# Patient Record
Sex: FEMALE | Race: WHITE | Employment: OTHER | URBAN - METROPOLITAN AREA
[De-identification: names, ages, dates, MRNs, and addresses within clinical notes are randomized per-mention and may not be internally consistent; named-entity substitution may affect disease eponyms.]

---

## 2018-08-22 ENCOUNTER — HOSPITAL ENCOUNTER (OUTPATIENT)
Age: 68
Discharge: HOME | End: 2018-08-24
Payer: MEDICARE

## 2018-08-22 LAB
ALBUMIN SERPL-MCNC: 4.1 G/DL (ref 3.5–5.2)
ALP BLD-CCNC: 47 U/L (ref 35–104)
ALT SERPL-CCNC: 20 U/L (ref 0–32)
ANION GAP SERPL CALCULATED.3IONS-SCNC: 12 MMOL/L (ref 7–16)
AST SERPL-CCNC: 23 U/L (ref 0–31)
BASOPHILS ABSOLUTE: 0.05 E9/L (ref 0–0.2)
BASOPHILS RELATIVE PERCENT: 0.9 % (ref 0–2)
BILIRUB SERPL-MCNC: 0.5 MG/DL (ref 0–1.2)
BUN BLDV-MCNC: 17 MG/DL (ref 8–23)
CALCIUM SERPL-MCNC: 9.4 MG/DL (ref 8.6–10.2)
CHLORIDE BLD-SCNC: 103 MMOL/L (ref 98–107)
CHOLESTEROL, TOTAL: 234 MG/DL (ref 0–199)
CO2: 25 MMOL/L (ref 22–29)
CREAT SERPL-MCNC: 0.8 MG/DL (ref 0.5–1)
EOSINOPHILS ABSOLUTE: 0.15 E9/L (ref 0.05–0.5)
EOSINOPHILS RELATIVE PERCENT: 2.6 % (ref 0–6)
GFR AFRICAN AMERICAN: >60
GFR NON-AFRICAN AMERICAN: >60 ML/MIN/1.73
GLUCOSE BLD-MCNC: 91 MG/DL (ref 74–109)
HCT VFR BLD CALC: 39.9 % (ref 34–48)
HDLC SERPL-MCNC: 82 MG/DL
HEMOGLOBIN: 12.8 G/DL (ref 11.5–15.5)
IMMATURE GRANULOCYTES #: 0.02 E9/L
IMMATURE GRANULOCYTES %: 0.4 % (ref 0–5)
LDL CHOLESTEROL CALCULATED: 134 MG/DL (ref 0–99)
LYMPHOCYTES ABSOLUTE: 1.65 E9/L (ref 1.5–4)
LYMPHOCYTES RELATIVE PERCENT: 29 % (ref 20–42)
MCH RBC QN AUTO: 30.3 PG (ref 26–35)
MCHC RBC AUTO-ENTMCNC: 32.1 % (ref 32–34.5)
MCV RBC AUTO: 94.3 FL (ref 80–99.9)
MONOCYTES ABSOLUTE: 0.62 E9/L (ref 0.1–0.95)
MONOCYTES RELATIVE PERCENT: 10.9 % (ref 2–12)
NEUTROPHILS ABSOLUTE: 3.19 E9/L (ref 1.8–7.3)
NEUTROPHILS RELATIVE PERCENT: 56.2 % (ref 43–80)
PDW BLD-RTO: 13.7 FL (ref 11.5–15)
PLATELET # BLD: 273 E9/L (ref 130–450)
PMV BLD AUTO: 11 FL (ref 7–12)
POTASSIUM SERPL-SCNC: 4.7 MMOL/L (ref 3.5–5)
RBC # BLD: 4.23 E12/L (ref 3.5–5.5)
SODIUM BLD-SCNC: 140 MMOL/L (ref 132–146)
TOTAL PROTEIN: 6.9 G/DL (ref 6.4–8.3)
TRIGL SERPL-MCNC: 92 MG/DL (ref 0–149)
VLDLC SERPL CALC-MCNC: 18 MG/DL
WBC # BLD: 5.7 E9/L (ref 4.5–11.5)

## 2018-08-22 PROCEDURE — 80053 COMPREHEN METABOLIC PANEL: CPT

## 2018-08-22 PROCEDURE — 80061 LIPID PANEL: CPT

## 2018-08-22 PROCEDURE — 85025 COMPLETE CBC W/AUTO DIFF WBC: CPT

## 2018-08-28 ENCOUNTER — TELEPHONE (OUTPATIENT)
Dept: BREAST CENTER | Age: 68
End: 2018-08-28

## 2018-08-31 DIAGNOSIS — Z85.3 HISTORY OF RIGHT BREAST CANCER: Primary | ICD-10-CM

## 2018-09-14 ASSESSMENT — ENCOUNTER SYMPTOMS
VOMITING: 0
COUGH: 0
EYE ITCHING: 0
EYE DISCHARGE: 0
TROUBLE SWALLOWING: 0
RHINORRHEA: 0
CHOKING: 0
ABDOMINAL PAIN: 0
SHORTNESS OF BREATH: 0
SORE THROAT: 0
SINUS PAIN: 0
NAUSEA: 0
WHEEZING: 0
CONSTIPATION: 0
ABDOMINAL DISTENTION: 0
DIARRHEA: 0
CHEST TIGHTNESS: 0
BLOOD IN STOOL: 0
VOICE CHANGE: 0
SINUS PRESSURE: 0
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
## 2018-09-24 ENCOUNTER — OFFICE VISIT (OUTPATIENT)
Dept: BREAST CENTER | Age: 68
End: 2018-09-24
Payer: MEDICARE

## 2018-09-24 ENCOUNTER — HOSPITAL ENCOUNTER (OUTPATIENT)
Dept: GENERAL RADIOLOGY | Age: 68
Discharge: HOME | End: 2018-09-26
Payer: MEDICARE

## 2018-09-24 VITALS
OXYGEN SATURATION: 96 % | SYSTOLIC BLOOD PRESSURE: 112 MMHG | BODY MASS INDEX: 23.34 KG/M2 | HEIGHT: 65 IN | HEART RATE: 78 BPM | DIASTOLIC BLOOD PRESSURE: 70 MMHG | TEMPERATURE: 98 F | WEIGHT: 140.1 LBS | RESPIRATION RATE: 16 BRPM

## 2018-09-24 DIAGNOSIS — Z85.3 HISTORY OF RIGHT BREAST CANCER: ICD-10-CM

## 2018-09-24 DIAGNOSIS — Z85.3 PERSONAL HISTORY OF MALIGNANT NEOPLASM OF BREAST: Primary | ICD-10-CM

## 2018-09-24 PROCEDURE — 1036F TOBACCO NON-USER: CPT | Performed by: NURSE PRACTITIONER

## 2018-09-24 PROCEDURE — 77067 SCR MAMMO BI INCL CAD: CPT

## 2018-09-24 PROCEDURE — G8427 DOCREV CUR MEDS BY ELIG CLIN: HCPCS | Performed by: NURSE PRACTITIONER

## 2018-09-24 PROCEDURE — G8420 CALC BMI NORM PARAMETERS: HCPCS | Performed by: NURSE PRACTITIONER

## 2018-09-24 PROCEDURE — 1123F ACP DISCUSS/DSCN MKR DOCD: CPT | Performed by: NURSE PRACTITIONER

## 2018-09-24 PROCEDURE — 4040F PNEUMOC VAC/ADMIN/RCVD: CPT | Performed by: NURSE PRACTITIONER

## 2018-09-24 PROCEDURE — 99204 OFFICE O/P NEW MOD 45 MIN: CPT | Performed by: NURSE PRACTITIONER

## 2018-09-24 PROCEDURE — 1090F PRES/ABSN URINE INCON ASSESS: CPT | Performed by: NURSE PRACTITIONER

## 2018-09-24 PROCEDURE — G8400 PT W/DXA NO RESULTS DOC: HCPCS | Performed by: NURSE PRACTITIONER

## 2018-09-24 PROCEDURE — 99203 OFFICE O/P NEW LOW 30 MIN: CPT

## 2018-09-24 PROCEDURE — 3017F COLORECTAL CA SCREEN DOC REV: CPT | Performed by: NURSE PRACTITIONER

## 2018-09-24 PROCEDURE — 1101F PT FALLS ASSESS-DOCD LE1/YR: CPT | Performed by: NURSE PRACTITIONER

## 2018-09-24 ASSESSMENT — ENCOUNTER SYMPTOMS: BACK PAIN: 1

## 2018-09-25 ENCOUNTER — TELEPHONE (OUTPATIENT)
Dept: BREAST CENTER | Age: 68
End: 2018-09-25

## 2018-09-25 DIAGNOSIS — Z12.31 VISIT FOR SCREENING MAMMOGRAM: Primary | ICD-10-CM

## 2018-10-11 ENCOUNTER — OFFICE VISIT (OUTPATIENT)
Dept: SURGERY | Age: 68
End: 2018-10-11
Payer: MEDICARE

## 2018-10-11 VITALS
OXYGEN SATURATION: 97 % | TEMPERATURE: 97.9 F | DIASTOLIC BLOOD PRESSURE: 64 MMHG | HEIGHT: 65 IN | WEIGHT: 140 LBS | BODY MASS INDEX: 23.32 KG/M2 | SYSTOLIC BLOOD PRESSURE: 104 MMHG | HEART RATE: 92 BPM

## 2018-10-11 DIAGNOSIS — Z85.3 HX OF BREAST CANCER: Primary | ICD-10-CM

## 2018-10-11 PROCEDURE — 1090F PRES/ABSN URINE INCON ASSESS: CPT | Performed by: PLASTIC SURGERY

## 2018-10-11 PROCEDURE — G8427 DOCREV CUR MEDS BY ELIG CLIN: HCPCS | Performed by: PLASTIC SURGERY

## 2018-10-11 PROCEDURE — 99204 OFFICE O/P NEW MOD 45 MIN: CPT | Performed by: PLASTIC SURGERY

## 2018-10-11 PROCEDURE — G8484 FLU IMMUNIZE NO ADMIN: HCPCS | Performed by: PLASTIC SURGERY

## 2018-10-11 PROCEDURE — 3017F COLORECTAL CA SCREEN DOC REV: CPT | Performed by: PLASTIC SURGERY

## 2018-10-11 PROCEDURE — G8420 CALC BMI NORM PARAMETERS: HCPCS | Performed by: PLASTIC SURGERY

## 2018-10-11 PROCEDURE — 1101F PT FALLS ASSESS-DOCD LE1/YR: CPT | Performed by: PLASTIC SURGERY

## 2018-10-11 PROCEDURE — 1036F TOBACCO NON-USER: CPT | Performed by: PLASTIC SURGERY

## 2018-10-11 PROCEDURE — 1123F ACP DISCUSS/DSCN MKR DOCD: CPT | Performed by: PLASTIC SURGERY

## 2018-10-11 PROCEDURE — 4040F PNEUMOC VAC/ADMIN/RCVD: CPT | Performed by: PLASTIC SURGERY

## 2018-10-11 PROCEDURE — G8400 PT W/DXA NO RESULTS DOC: HCPCS | Performed by: PLASTIC SURGERY

## 2018-10-11 NOTE — PROGRESS NOTES
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 ANAND-ALCL is not a breast cancer, but a rare and treatable T-cell lymphoma that usually develops as a fluid swelling around breast implants. The lifetime risk for this disease appears to be about 1 case for every 30,000 textured implants. Thus far, there have been no confirmed cases of ANAND-ALCL in women who have had only smooth-surface breast implants. The FDA is not recommending removal of textured implants. Rather, the FDA recommends, as do I, that every woman conduct regular self-examination. The patient was educated that if she does develop ANAND-ALCL she may require additional treatment such as radiation or chemotherapy along with removal of the breast implant and surrounding scar tissue. The risks, benefits and options were discussed with the pt. The risks included but not limited to pain, bleeding, infection, heavy scarring, damage to surrounding structures,  and need for further procedures. Other risks including but not limited to asymmetry, loss of nipple or/and areola, loss of sensation to nipple and areola, inability to breast feed, seroma, hematoma, implant failure, capsular contracture, and fat necrosis were also discussed with the patient. All of her questions were answered. .    Plastic and Reconstructive surgical procedure- no reconstruction at this time as patient does not want surgery. I informed the patient that her options include a left breast reduction to better match her right breast parenchyma. She states she will contemplate this surgical option and let our office know she wants to do this in the future. All of her questions were answered to her satisfaction and she agrees to proceed with the operation. Face-to-face time greater than 45 minutes, greater than 50% in counseling, education, and coordination of care. Photos were obtained.  Chaperone present      I attest that the patient was

## 2018-10-30 ENCOUNTER — APPOINTMENT (OUTPATIENT)
Dept: GENERAL RADIOLOGY | Age: 68
End: 2018-10-30
Payer: MEDICARE

## 2018-10-30 ENCOUNTER — HOSPITAL ENCOUNTER (EMERGENCY)
Age: 68
Discharge: HOME | End: 2018-10-30
Payer: MEDICARE

## 2018-10-30 VITALS
HEART RATE: 72 BPM | OXYGEN SATURATION: 98 % | DIASTOLIC BLOOD PRESSURE: 82 MMHG | SYSTOLIC BLOOD PRESSURE: 116 MMHG | RESPIRATION RATE: 16 BRPM | TEMPERATURE: 97.7 F

## 2018-10-30 DIAGNOSIS — S40.021A CONTUSION OF MULTIPLE SITES OF RIGHT SHOULDER AND UPPER ARM, INITIAL ENCOUNTER: Primary | ICD-10-CM

## 2018-10-30 DIAGNOSIS — S40.011A CONTUSION OF MULTIPLE SITES OF RIGHT SHOULDER AND UPPER ARM, INITIAL ENCOUNTER: Primary | ICD-10-CM

## 2018-10-30 PROCEDURE — 99212 OFFICE O/P EST SF 10 MIN: CPT

## 2018-10-30 PROCEDURE — 73030 X-RAY EXAM OF SHOULDER: CPT

## 2018-10-30 ASSESSMENT — PAIN DESCRIPTION - PAIN TYPE: TYPE: ACUTE PAIN

## 2018-10-30 ASSESSMENT — PAIN DESCRIPTION - FREQUENCY: FREQUENCY: CONTINUOUS

## 2018-10-30 ASSESSMENT — PAIN SCALES - GENERAL: PAINLEVEL_OUTOF10: 6

## 2018-10-30 ASSESSMENT — PAIN DESCRIPTION - PROGRESSION: CLINICAL_PROGRESSION: GRADUALLY WORSENING

## 2018-10-30 ASSESSMENT — PAIN DESCRIPTION - ORIENTATION: ORIENTATION: RIGHT

## 2018-10-30 ASSESSMENT — PAIN DESCRIPTION - LOCATION: LOCATION: SHOULDER

## 2019-08-22 ENCOUNTER — HOSPITAL ENCOUNTER (OUTPATIENT)
Age: 69
Discharge: HOME | End: 2019-08-24
Payer: MEDICARE

## 2019-08-22 LAB
ALBUMIN SERPL-MCNC: 4.3 G/DL (ref 3.5–5.2)
ALP BLD-CCNC: 49 U/L (ref 35–104)
ALT SERPL-CCNC: 22 U/L (ref 0–32)
ANION GAP SERPL CALCULATED.3IONS-SCNC: 12 MMOL/L (ref 7–16)
AST SERPL-CCNC: 29 U/L (ref 0–31)
BASOPHILS ABSOLUTE: 0.05 E9/L (ref 0–0.2)
BASOPHILS RELATIVE PERCENT: 0.9 % (ref 0–2)
BILIRUB SERPL-MCNC: 0.4 MG/DL (ref 0–1.2)
BUN BLDV-MCNC: 17 MG/DL (ref 8–23)
CALCIUM SERPL-MCNC: 9.6 MG/DL (ref 8.6–10.2)
CHLORIDE BLD-SCNC: 104 MMOL/L (ref 98–107)
CHOLESTEROL, TOTAL: 266 MG/DL (ref 0–199)
CO2: 25 MMOL/L (ref 22–29)
CREAT SERPL-MCNC: 0.8 MG/DL (ref 0.5–1)
EOSINOPHILS ABSOLUTE: 0.2 E9/L (ref 0.05–0.5)
EOSINOPHILS RELATIVE PERCENT: 3.5 % (ref 0–6)
GFR AFRICAN AMERICAN: >60
GFR NON-AFRICAN AMERICAN: >60 ML/MIN/1.73
GLUCOSE BLD-MCNC: 94 MG/DL (ref 74–99)
HCT VFR BLD CALC: 42.4 % (ref 34–48)
HDLC SERPL-MCNC: 83 MG/DL
HEMOGLOBIN: 13.7 G/DL (ref 11.5–15.5)
IMMATURE GRANULOCYTES #: 0.02 E9/L
IMMATURE GRANULOCYTES %: 0.3 % (ref 0–5)
LDL CHOLESTEROL CALCULATED: 159 MG/DL (ref 0–99)
LYMPHOCYTES ABSOLUTE: 1.56 E9/L (ref 1.5–4)
LYMPHOCYTES RELATIVE PERCENT: 27.1 % (ref 20–42)
MCH RBC QN AUTO: 31.1 PG (ref 26–35)
MCHC RBC AUTO-ENTMCNC: 32.3 % (ref 32–34.5)
MCV RBC AUTO: 96.1 FL (ref 80–99.9)
MONOCYTES ABSOLUTE: 0.55 E9/L (ref 0.1–0.95)
MONOCYTES RELATIVE PERCENT: 9.5 % (ref 2–12)
NEUTROPHILS ABSOLUTE: 3.38 E9/L (ref 1.8–7.3)
NEUTROPHILS RELATIVE PERCENT: 58.7 % (ref 43–80)
PDW BLD-RTO: 13.3 FL (ref 11.5–15)
PLATELET # BLD: 281 E9/L (ref 130–450)
PMV BLD AUTO: 10.5 FL (ref 7–12)
POTASSIUM REFLEX MAGNESIUM: 5 MMOL/L (ref 3.5–5)
RBC # BLD: 4.41 E12/L (ref 3.5–5.5)
SODIUM BLD-SCNC: 141 MMOL/L (ref 132–146)
T4 FREE: 1.31 NG/DL (ref 0.93–1.7)
TOTAL PROTEIN: 6.9 G/DL (ref 6.4–8.3)
TRIGL SERPL-MCNC: 119 MG/DL (ref 0–149)
TSH SERPL DL<=0.05 MIU/L-ACNC: 1.41 UIU/ML (ref 0.27–4.2)
VITAMIN D 25-HYDROXY: 35 NG/ML (ref 30–100)
VLDLC SERPL CALC-MCNC: 24 MG/DL
WBC # BLD: 5.8 E9/L (ref 4.5–11.5)

## 2019-08-22 PROCEDURE — 80053 COMPREHEN METABOLIC PANEL: CPT

## 2019-08-22 PROCEDURE — 85025 COMPLETE CBC W/AUTO DIFF WBC: CPT

## 2019-08-22 PROCEDURE — 80061 LIPID PANEL: CPT

## 2019-08-22 PROCEDURE — 82306 VITAMIN D 25 HYDROXY: CPT

## 2019-08-22 PROCEDURE — 84439 ASSAY OF FREE THYROXINE: CPT

## 2019-08-22 PROCEDURE — 84443 ASSAY THYROID STIM HORMONE: CPT

## 2019-09-17 ENCOUNTER — HOSPITAL ENCOUNTER (OUTPATIENT)
Dept: MAMMOGRAPHY | Age: 69
Discharge: HOME | End: 2019-09-19
Payer: MEDICARE

## 2019-09-17 DIAGNOSIS — M81.0 AGE-RELATED OSTEOPOROSIS WITHOUT CURRENT PATHOLOGICAL FRACTURE: ICD-10-CM

## 2019-09-17 PROCEDURE — 77080 DXA BONE DENSITY AXIAL: CPT

## 2019-09-20 ASSESSMENT — ENCOUNTER SYMPTOMS
TROUBLE SWALLOWING: 0
ABDOMINAL DISTENTION: 0
RHINORRHEA: 0
SHORTNESS OF BREATH: 0
BLOOD IN STOOL: 0
DIARRHEA: 0
EYE DISCHARGE: 0
COUGH: 0
SORE THROAT: 0
BACK PAIN: 1
ABDOMINAL PAIN: 0
VOMITING: 0
SINUS PAIN: 0
CHEST TIGHTNESS: 0
CHOKING: 0
EYE ITCHING: 0
VOICE CHANGE: 0
CONSTIPATION: 0
WHEEZING: 0
SINUS PRESSURE: 0
NAUSEA: 0

## 2019-09-20 NOTE — PROGRESS NOTES
quit: 1977     Years since quittin.6    Smokeless tobacco: Never Used   Substance and Sexual Activity    Alcohol use: No     Alcohol/week: 0.0 standard drinks     Comment: socially    Drug use: No    Sexual activity: Yes     Partners: Male   Lifestyle    Physical activity:     Days per week: Not on file     Minutes per session: Not on file    Stress: Not on file   Relationships    Social connections:     Talks on phone: Not on file     Gets together: Not on file     Attends Religion service: Not on file     Active member of club or organization: Not on file     Attends meetings of clubs or organizations: Not on file     Relationship status: Not on file    Intimate partner violence:     Fear of current or ex partner: Not on file     Emotionally abused: Not on file     Physically abused: Not on file     Forced sexual activity: Not on file   Other Topics Concern    Not on file   Social History Narrative    Not on file       Occupation: Retired Nurse. Review of Systems   Constitutional: Negative for activity change, appetite change, chills, fatigue, fever and unexpected weight change. HENT: Negative for congestion, postnasal drip, rhinorrhea, sinus pressure, sinus pain, sore throat, trouble swallowing and voice change. Eyes: Negative for discharge, itching and visual disturbance. Respiratory: Negative for cough, choking, chest tightness, shortness of breath and wheezing. Cardiovascular: Negative for chest pain, palpitations and leg swelling. Gastrointestinal: Negative for abdominal distention, abdominal pain, blood in stool, constipation, diarrhea, nausea and vomiting. Endocrine: Negative for cold intolerance and heat intolerance. Genitourinary: Negative for difficulty urinating, dysuria, frequency and hematuria. Musculoskeletal: Positive for back pain. Negative for arthralgias, gait problem, joint swelling, myalgias, neck pain and neck stiffness.         Chronic back pain, today, 09/25/2019:    Finding 1:   There is a focal asymmetry seen in the posterior central region of the left breast.       IMPRESSION:   Focal asymmetry in the left breast requires additional evaluation. An ultrasound exam is recommended.       =======================================   BI-RADS Category 0:  Incomplete: Need Additional Imaging Evaluation     Left breast US 09/25/2019:  Finding 1:   Sonography was performed in the left breast using a radial and anti-radial approach. Additional evaluation was performed for the focal asymmetry in the left breast, central seen on 09/25/2019. No sonographic evidence of suspicious solid or cystic mass    lesions. No focal areas of suspicious atypical echogenicity.       IMPRESSION:   There is no sonographic evidence of malignancy.       Screening mammogram in 1 year is recommended.       =======================================   BI-RADS Category 1:  Negative     Clinically, she is doing well and is without evidence of recurrence. We reviewed her genetic testing results. She reports that her Sister and her Niece also had genetic testing and all of them were also found to have BRCA-1 VUS. Reviewed screening guidelines as recommended by [employer] (recommending high risk protocol). At this time, will plan to have her return in 6 months with MRI bilateral breasts prior. Plan:      1. Continue monthly breast self examination; detailed instructions reviewed today. Bring any changes to your physician's attention. 2. Continue healthy diet and exercise routinely as tolerated. 3. Avoid alcohol or limit alcohol intake to < 3 drinks per week. 4. Limit caffeine intake. 5. Repeat mammogram 1 year. 6. Continue follow up with Primary Care. 7. RTC 6 months with MRI breast prior. During today's visit, face-to-face time 25 minutes, greater than 50% in counseling education and coordination of care.  All questions were answered to her apparent satisfaction, and she is agreeable to the plan as outlined above. Carmen Perez, RN, MSN, ACNP-BC, AOCNP  Advanced Oncology Certified Nurse Practitioner  Department of Breast Surgery  [facility]/  [facility] in collaboration with Dr. Julieta Muro.  Jeremiah Wright, APRN - CNP

## 2019-09-25 ENCOUNTER — HOSPITAL ENCOUNTER (OUTPATIENT)
Dept: GENERAL RADIOLOGY | Age: 69
Discharge: HOME | End: 2019-09-27
Payer: MEDICARE

## 2019-09-25 ENCOUNTER — OFFICE VISIT (OUTPATIENT)
Dept: BREAST CENTER | Age: 69
End: 2019-09-25
Payer: MEDICARE

## 2019-09-25 VITALS
BODY MASS INDEX: 23.63 KG/M2 | OXYGEN SATURATION: 98 % | WEIGHT: 147 LBS | RESPIRATION RATE: 16 BRPM | HEIGHT: 66 IN | HEART RATE: 76 BPM | SYSTOLIC BLOOD PRESSURE: 120 MMHG | DIASTOLIC BLOOD PRESSURE: 80 MMHG | TEMPERATURE: 97.8 F

## 2019-09-25 DIAGNOSIS — Z12.31 VISIT FOR SCREENING MAMMOGRAM: ICD-10-CM

## 2019-09-25 DIAGNOSIS — R92.8 ABNORMAL MAMMOGRAM OF LEFT BREAST: ICD-10-CM

## 2019-09-25 DIAGNOSIS — R92.2 DENSE BREAST TISSUE ON MAMMOGRAM: Primary | ICD-10-CM

## 2019-09-25 DIAGNOSIS — Z85.3 PERSONAL HISTORY OF BREAST CANCER: ICD-10-CM

## 2019-09-25 DIAGNOSIS — Z12.39 BREAST CANCER SCREENING, HIGH RISK PATIENT: ICD-10-CM

## 2019-09-25 PROCEDURE — 99214 OFFICE O/P EST MOD 30 MIN: CPT | Performed by: NURSE PRACTITIONER

## 2019-09-25 PROCEDURE — G8420 CALC BMI NORM PARAMETERS: HCPCS | Performed by: NURSE PRACTITIONER

## 2019-09-25 PROCEDURE — 4040F PNEUMOC VAC/ADMIN/RCVD: CPT | Performed by: NURSE PRACTITIONER

## 2019-09-25 PROCEDURE — G8427 DOCREV CUR MEDS BY ELIG CLIN: HCPCS | Performed by: NURSE PRACTITIONER

## 2019-09-25 PROCEDURE — 77063 BREAST TOMOSYNTHESIS BI: CPT

## 2019-09-25 PROCEDURE — 1090F PRES/ABSN URINE INCON ASSESS: CPT | Performed by: NURSE PRACTITIONER

## 2019-09-25 PROCEDURE — 1123F ACP DISCUSS/DSCN MKR DOCD: CPT | Performed by: NURSE PRACTITIONER

## 2019-09-25 PROCEDURE — 76642 ULTRASOUND BREAST LIMITED: CPT

## 2019-09-25 PROCEDURE — 3017F COLORECTAL CA SCREEN DOC REV: CPT | Performed by: NURSE PRACTITIONER

## 2019-09-25 PROCEDURE — G8399 PT W/DXA RESULTS DOCUMENT: HCPCS | Performed by: NURSE PRACTITIONER

## 2019-09-25 PROCEDURE — 1036F TOBACCO NON-USER: CPT | Performed by: NURSE PRACTITIONER

## 2019-09-25 PROCEDURE — 99213 OFFICE O/P EST LOW 20 MIN: CPT | Performed by: NURSE PRACTITIONER

## 2019-09-25 RX ORDER — ANTIARTHRITIC COMBINATION NO.2 900 MG
TABLET ORAL DAILY
COMMUNITY

## 2019-09-25 RX ORDER — LORATADINE 10 MG/1
10 TABLET ORAL DAILY
COMMUNITY

## 2020-02-19 ENCOUNTER — TELEPHONE (OUTPATIENT)
Dept: BREAST CENTER | Age: 70
End: 2020-02-19

## 2020-02-19 NOTE — TELEPHONE ENCOUNTER
Spoke with patient in reference to her breast MRI that is due March 2020. She will have lab work done prior at [facility]. No cycles. Will obtain authorization from her insurance and schedule.

## 2020-03-02 ENCOUNTER — HOSPITAL ENCOUNTER (OUTPATIENT)
Age: 70
Discharge: HOME | End: 2020-03-02
Payer: MEDICARE

## 2020-03-02 LAB
BUN BLDV-MCNC: 15 MG/DL (ref 8–23)
CREAT SERPL-MCNC: 0.9 MG/DL (ref 0.5–1)
GFR AFRICAN AMERICAN: >60
GFR NON-AFRICAN AMERICAN: >60 ML/MIN/1.73

## 2020-03-02 PROCEDURE — 82565 ASSAY OF CREATININE: CPT

## 2020-03-02 PROCEDURE — 36415 COLL VENOUS BLD VENIPUNCTURE: CPT

## 2020-03-02 PROCEDURE — 84520 ASSAY OF UREA NITROGEN: CPT

## 2020-03-05 ENCOUNTER — HOSPITAL ENCOUNTER (OUTPATIENT)
Dept: MRI IMAGING | Age: 70
Discharge: HOME | End: 2020-03-07
Payer: MEDICARE

## 2020-03-05 PROCEDURE — 6360000004 HC RX CONTRAST MEDICATION: Performed by: RADIOLOGY

## 2020-03-05 PROCEDURE — A9585 GADOBUTROL INJECTION: HCPCS | Performed by: RADIOLOGY

## 2020-03-05 PROCEDURE — 77049 MRI BREAST C-+ W/CAD BI: CPT

## 2020-03-05 RX ADMIN — GADOBUTROL 6 ML: 604.72 INJECTION INTRAVENOUS at 14:08

## 2020-03-06 ASSESSMENT — ENCOUNTER SYMPTOMS
NAUSEA: 0
CONSTIPATION: 0
DIARRHEA: 0
EYE DISCHARGE: 0
ABDOMINAL DISTENTION: 0
ABDOMINAL PAIN: 0
TROUBLE SWALLOWING: 0
RHINORRHEA: 0
SINUS PAIN: 0
SHORTNESS OF BREATH: 0
VOICE CHANGE: 0
CHOKING: 0
EYE ITCHING: 0
CHEST TIGHTNESS: 0
BLOOD IN STOOL: 0
VOMITING: 0
COUGH: 0
WHEEZING: 0
SORE THROAT: 0
SINUS PRESSURE: 0

## 2020-03-06 NOTE — PROGRESS NOTES
Subjective:  Hx pre-menopausal Right IDC in ;  Treated with lumpectomy, requiring re-excision. Reports axillary LN were negative. -Received adjuvant Chemotherapy (?TC) ×4 cycles at [facility].   -XRT X 6 weeks.    -Endocrine therapy with Tamoxifen, but only took it for 1 year; on Raloxifene since that time. Patient ID: Ben Bauer is a 79 y.o. female. HPI   History and Physical    Patient's Name/Date of Birth: Ben Bauer / 1950      Ben Bauer presents to establish care. PCP: Amaya Alcazar MD.    79 y.o. female with PMH breast cancer in  at the age of 37.      -History of Leiomyoma  post TAHBSO    Estimated body mass index is 24.09 kg/m² as calculated from the following:    Height as of 19: 5' 5.5\" (1.664 m). Weight as of 19: 147 lb (66.7 kg). Bra Size: 32DD on Left; 32 C    Patient admits to a personal history of breast cancer. Breast cancer risk factors include age, gender, personal history of breast cancer; Mother with breast cancer in her 63's with reoccurrence in her 66's;  Sister with breast cancer in her 63's; Paternal grandmother with Pancreatic cancer. Age of menarche was 15. Age of menopause was 1994 due to chemotherapy. Patient denies hormonal therapy. Patient is . Age of first live birth was 32;  Patient did breast feed. Patient drinks little caffeinated beverages. She does not smoke cigarettes. Quit .       Past Medical History:   Diagnosis Date    Arthritis     Breast cancer ([address]     infiltrating ductal  right breast    Hallux rigidus of right foot 2017    Neuroma of foot     great toe    PONV (postoperative nausea and vomiting)        Past Surgical History:   Procedure Laterality Date    APPENDECTOMY      BREAST SURGERY Right     lumpectomy    COLONOSCOPY  2016    HYSTERECTOMY      JOINT REPLACEMENT Left     knee, left    LAMINECTOMY  09/01/2015    x2    LUMBAR FUSION      L4-L5 X2    OVARIAN CYST SURGERY      PRE-MALIGNANT / BENIGN SKIN LESION EXCISION      squamous cell     TOE FUSION Right 02/21/2017    Right 1st Metatarsophalangeal Joint fusion right great toe neuroma resection    TOE SURGERY Right     great toe    TONSILLECTOMY         Current Outpatient Medications   Medication Sig Dispense Refill    loratadine (CLARITIN) 10 MG tablet Take 10 mg by mouth daily      Biotin 5000 MCG TABS Take by mouth daily      traMADol (ULTRAM) 50 MG tablet Take 50 mg by mouth every 6 hours as needed for Pain      celecoxib (CELEBREX) 100 MG capsule Take 100 mg by mouth      tiZANidine (ZANAFLEX) 4 MG tablet Take 4 mg by mouth 2 times daily      calcium carbonate (TUMS) 500 MG chewable tablet Take 1 tablet by mouth as needed for Heartburn      RaNITidine HCl (ZANTAC 150 MAXIMUM STRENGTH PO) Take 1 tablet by mouth 2 times daily       raloxifene (EVISTA) 60 MG tablet Take 60 mg by mouth daily       Calcium Carbonate-Vitamin D (CALCIUM + D PO) Take 1 tablet by mouth daily        No current facility-administered medications for this visit.       Facility-Administered Medications Ordered in Other Visits   Medication Dose Route Frequency Provider Last Rate Last Dose    gadobutrol (GADAVIST) injection 6 mL  6 mL Intravenous ONCE PRN Norm Smack, DO           Allergies   Allergen Reactions    Tape [place] Tape] Dermatitis     paper           Social History     Socioeconomic History    Marital status:      Spouse name: Not on file    Number of children: Not on file    Years of education: Not on file    Highest education level: Not on file   Occupational History    Not on file   Social Needs    Financial resource strain: Not on file    Food insecurity:     Worry: Not on file     Inability: Not on file    Transportation needs:     Medical: Not on file     Non-medical: Not on file   Tobacco Use    Smoking status: Former Smoker     Packs/day: 1.00     Years: 9.00     Pack years: 9.00 Types: Cigarettes     Last attempt to quit: 1977     Years since quittin.0    Smokeless tobacco: Never Used   Substance and Sexual Activity    Alcohol use: No     Alcohol/week: 0.0 standard drinks     Comment: socially    Drug use: No    Sexual activity: Yes     Partners: Male   Lifestyle    Physical activity:     Days per week: Not on file     Minutes per session: Not on file    Stress: Not on file   Relationships    Social connections:     Talks on phone: Not on file     Gets together: Not on file     Attends Advent service: Not on file     Active member of club or organization: Not on file     Attends meetings of clubs or organizations: Not on file     Relationship status: Not on file    Intimate partner violence:     Fear of current or ex partner: Not on file     Emotionally abused: Not on file     Physically abused: Not on file     Forced sexual activity: Not on file   Other Topics Concern    Not on file   Social History Narrative    Not on file       Occupation: Retired Nurse. Review of Systems   Constitutional: Negative for activity change, appetite change, chills, fatigue, fever and unexpected weight change. HENT: Negative for congestion, postnasal drip, rhinorrhea, sinus pressure, sinus pain, sore throat, trouble swallowing and voice change. Eyes: Negative for discharge, itching and visual disturbance. Respiratory: Negative for cough, choking, chest tightness, shortness of breath and wheezing. Cardiovascular: Negative for chest pain, palpitations and leg swelling. Gastrointestinal: Negative for abdominal distention, abdominal pain, blood in stool, constipation, diarrhea, nausea and vomiting. Diverticulosis on colonoscopy; constipation controlled with AM fiber. Endocrine: Negative for cold intolerance and heat intolerance. Genitourinary: Negative for difficulty urinating, dysuria, frequency and hematuria.    Musculoskeletal: Negative for arthralgias, back pain, gait problem, joint swelling, myalgias, neck pain and neck stiffness. Chronic back pain, post fusions. Right foot drop due to back pain. Overall her pain has remained stable. Allergic/Immunologic: Negative for environmental allergies and food allergies. Neurological: Negative for dizziness, seizures, syncope, speech difficulty, weakness, light-headedness and headaches. Hematological: Negative for adenopathy. Does not bruise/bleed easily. Psychiatric/Behavioral: Negative for agitation, confusion and decreased concentration. The patient is not nervous/anxious. Objective:   Physical Exam  Vitals signs and nursing note reviewed. Constitutional:       General: She is not in acute distress. Appearance: She is well-developed. She is not diaphoretic. Comments: ECOG remains stable at 0   HENT:      Head: Normocephalic and atraumatic. Mouth/Throat:      Pharynx: No oropharyngeal exudate. Eyes:      General: No scleral icterus. Right eye: No discharge. Left eye: No discharge. Conjunctiva/sclera: Conjunctivae normal.   Neck:      Musculoskeletal: Normal range of motion and neck supple. Thyroid: No thyromegaly. Vascular: No JVD. Trachea: No tracheal deviation. Cardiovascular:      Rate and Rhythm: Normal rate and regular rhythm. Heart sounds: No murmur. No friction rub. No gallop. Pulmonary:      Effort: Pulmonary effort is normal. No respiratory distress or retractions. Breath sounds: Normal breath sounds. No stridor. No wheezing or rales. Chest:      Chest wall: No mass, lacerations, deformity, swelling, tenderness or edema. Breasts: Breasts are asymmetrical (due to post treatment changes. ). Right: No inverted nipple, mass, nipple discharge, skin change or tenderness. Left: No inverted nipple, mass, nipple discharge, skin change or tenderness.       Comments: Mild skin thickening on the right c/w post treatment changes. Scars intact. Otherwise, left breast is supple. No skin dimpling or puckering. No nipple discharge. No clinically suspicious lumps nodules or masses appreciated. No axillary lymphadenopathy. Abdominal:      General: There is no distension. Palpations: Abdomen is soft. Tenderness: There is no abdominal tenderness. There is no guarding or rebound. Musculoskeletal: Normal range of motion. General: No tenderness or deformity. Right shoulder: Normal.      Left shoulder: Normal.   Lymphadenopathy:      Cervical: No cervical adenopathy. Right cervical: No superficial, deep or posterior cervical adenopathy. Left cervical: No superficial, deep or posterior cervical adenopathy. Upper Body:      Right upper body: No pectoral adenopathy. Left upper body: No pectoral adenopathy. Skin:     General: Skin is warm and dry. Coloration: Skin is not pale. Findings: No erythema or rash. Neurological:      Mental Status: She is alert and oriented to person, place, and time. Coordination: Coordination normal.   Psychiatric:         Behavior: Behavior normal.         Thought Content: Thought content normal.         Judgment: Judgment normal.          Assessment:      79 y.o. female with history of Premenopausal (age 37) IDC, (T1) right breast per her records in 1993, s/p lumpectomy with LND per Dr. Randolph Ward @ [facility] (reported as negative lymph nodes) Chemotherapy and Radiation therapy followed by endocrine therapy with Tamoxifen, however she only took it for 1 years due to poor tolerance; Continues on Raloxifene since that time. Pathology report from [facility] requested, but no longer available. Breast cancer risk factors include age, gender, personal history of breast cancer; Mother with breast cancer in her 63's with re-occurrence in her 66's;  Sister with breast cancer in her 63's; Paternal grandmother with Pancreatic cancer.     She presented 09/24/2018 to establish care.      -A bilateral screening mammogram 09/24/2018:  Negative. -LFT's 08/22/2018 were essentially normal.      -On 12/05/2018 Counsyl Genetic test: BRCA-1 VUS. Seen by Plastics and Reconstructive surgery, Dr. Mateo Rivers on 10/11/2018 for asymmetry; she opted to forgo surgery at this time. Bilateral screening mammogram 09/25/2019:    Finding 1:   There is a focal asymmetry seen in the posterior central region of the left breast.       IMPRESSION:   Focal asymmetry in the left breast requires additional evaluation. An ultrasound exam is recommended.       =======================================   BI-RADS Category 0:  Incomplete: Need Additional Imaging Evaluation     Left breast US 09/25/2019:  Finding 1:   Sonography was performed in the left breast using a radial and anti-radial approach. Additional evaluation was performed for the focal asymmetry in the left breast, central seen on 09/25/2019. No sonographic evidence of suspicious solid or cystic mass    lesions. No focal areas of suspicious atypical echogenicity.       IMPRESSION:   There is no sonographic evidence of malignancy.       Screening mammogram in 1 year is recommended.       =======================================   BI-RADS Category 1:  Negative       She reports that her Sister and her Niece also had genetic testing and all of them were also found to have BRCA-1 VUS. Reviewed screening guidelines as recommended by [employer] (recommending high risk protocol). -High risk screening per protocol:    -MRI bilateral breasts 03/06/2020: Benign findings (BI-RADS-2). -B/L screening mammogram today, 12/01/2020:  Negative, BI-RADS 1. Clinically, she is without evidence of malignancy. She continues on Raloxifene for bone health per PCP. No clinically suspicious findings. Diverticulosis:  Colonoscopy per Dr. Amor Yip June 2020. Plan:      1.  Continue monthly breast self examination; detailed instructions reviewed today. Bring any changes to your physician's attention. 2. Continue healthy diet and exercise routinely as tolerated. 3. Avoid alcohol or limit alcohol intake to < 3 drinks per week. 4. Limit caffeine intake. 5. Repeat mammogram 1 year. 6. Continue follow up with Primary Care. 7. RTC 6 months with MRI bilateral breasts prior. During today's visit, face-to-face time 15 minutes, greater than 50% in counseling education and coordination of care. All questions were answered to her apparent satisfaction, and she is agreeable to the plan as outlined above. Dominique Zavala, RN, MSN, ACNP-BC, AOCNP  Advanced Oncology Certified Nurse Practitioner  Department of Breast Surgery  [facility]/  [facility] in collaboration with Dr. Remigio Dalal.  Leticia Quick, ZOFIA - CNP

## 2020-03-18 ENCOUNTER — TELEPHONE (OUTPATIENT)
Dept: BREAST CENTER | Age: 70
End: 2020-03-18

## 2020-03-18 NOTE — TELEPHONE ENCOUNTER
Called patient regarding her upcoming appointment at [facility] (office of Dr. Negrita Robles, Dr. Ovidio Quinn, and Mateo Elliott CNP). Discussed concerns in light of the current Pandemic with COVID 19. We discussed that while there continue to be daily developments and information regarding this virus, we recognize the need to take all measures to ensure we protect the health and welfare of the patients we serve. With this information in mind, she was given the option of keeping this appointment versus postponing both her appointment and imaging until a later date in hopes the pandemic will subside. At this time, she prefers to rescheduled her appointment. She was given a future follow up appointment and our phone number with instructions to continue BSE and to contact us in the interim if she should notice any change.     Patient is rescheduled for 9/28/20 @ 1:00pm imaging/office visit 2:00pm

## 2020-10-05 ENCOUNTER — HOSPITAL ENCOUNTER (OUTPATIENT)
Age: 70
Discharge: HOME | End: 2020-10-07
Payer: MEDICARE

## 2020-10-05 LAB
ALBUMIN SERPL-MCNC: 4.1 G/DL (ref 3.5–5.2)
ALP BLD-CCNC: 53 U/L (ref 35–104)
ALT SERPL-CCNC: 17 U/L (ref 0–32)
ANION GAP SERPL CALCULATED.3IONS-SCNC: 11 MMOL/L (ref 7–16)
AST SERPL-CCNC: 21 U/L (ref 0–31)
BASOPHILS ABSOLUTE: 0.04 E9/L (ref 0–0.2)
BASOPHILS RELATIVE PERCENT: 0.7 % (ref 0–2)
BILIRUB SERPL-MCNC: 0.4 MG/DL (ref 0–1.2)
BUN BLDV-MCNC: 19 MG/DL (ref 8–23)
CALCIUM SERPL-MCNC: 9.6 MG/DL (ref 8.6–10.2)
CHLORIDE BLD-SCNC: 105 MMOL/L (ref 98–107)
CHOLESTEROL, TOTAL: 256 MG/DL (ref 0–199)
CO2: 26 MMOL/L (ref 22–29)
CREAT SERPL-MCNC: 0.9 MG/DL (ref 0.5–1)
EOSINOPHILS ABSOLUTE: 0.22 E9/L (ref 0.05–0.5)
EOSINOPHILS RELATIVE PERCENT: 3.6 % (ref 0–6)
GFR AFRICAN AMERICAN: >60
GFR NON-AFRICAN AMERICAN: >60 ML/MIN/1.73
GLUCOSE BLD-MCNC: 94 MG/DL (ref 74–99)
HCT VFR BLD CALC: 44.1 % (ref 34–48)
HDLC SERPL-MCNC: 82 MG/DL
HEMOGLOBIN: 14.2 G/DL (ref 11.5–15.5)
IMMATURE GRANULOCYTES #: 0.02 E9/L
IMMATURE GRANULOCYTES %: 0.3 % (ref 0–5)
LDL CHOLESTEROL CALCULATED: 150 MG/DL (ref 0–99)
LYMPHOCYTES ABSOLUTE: 1.47 E9/L (ref 1.5–4)
LYMPHOCYTES RELATIVE PERCENT: 24 % (ref 20–42)
MCH RBC QN AUTO: 30.9 PG (ref 26–35)
MCHC RBC AUTO-ENTMCNC: 32.2 % (ref 32–34.5)
MCV RBC AUTO: 96.1 FL (ref 80–99.9)
MONOCYTES ABSOLUTE: 0.64 E9/L (ref 0.1–0.95)
MONOCYTES RELATIVE PERCENT: 10.5 % (ref 2–12)
NEUTROPHILS ABSOLUTE: 3.73 E9/L (ref 1.8–7.3)
NEUTROPHILS RELATIVE PERCENT: 60.9 % (ref 43–80)
PDW BLD-RTO: 13.2 FL (ref 11.5–15)
PLATELET # BLD: 275 E9/L (ref 130–450)
PMV BLD AUTO: 10.7 FL (ref 7–12)
POTASSIUM SERPL-SCNC: 5.2 MMOL/L (ref 3.5–5)
RBC # BLD: 4.59 E12/L (ref 3.5–5.5)
SODIUM BLD-SCNC: 142 MMOL/L (ref 132–146)
T4 FREE: 1.06 NG/DL (ref 0.93–1.7)
TOTAL PROTEIN: 6.8 G/DL (ref 6.4–8.3)
TRIGL SERPL-MCNC: 122 MG/DL (ref 0–149)
TSH SERPL DL<=0.05 MIU/L-ACNC: 2.22 UIU/ML (ref 0.27–4.2)
VITAMIN D 25-HYDROXY: 48 NG/ML (ref 30–100)
VLDLC SERPL CALC-MCNC: 24 MG/DL
WBC # BLD: 6.1 E9/L (ref 4.5–11.5)

## 2020-10-05 PROCEDURE — 80053 COMPREHEN METABOLIC PANEL: CPT

## 2020-10-05 PROCEDURE — 85025 COMPLETE CBC W/AUTO DIFF WBC: CPT

## 2020-10-05 PROCEDURE — 84439 ASSAY OF FREE THYROXINE: CPT

## 2020-10-05 PROCEDURE — 82306 VITAMIN D 25 HYDROXY: CPT

## 2020-10-05 PROCEDURE — 84443 ASSAY THYROID STIM HORMONE: CPT

## 2020-10-05 PROCEDURE — 80061 LIPID PANEL: CPT

## 2020-12-01 ENCOUNTER — HOSPITAL ENCOUNTER (OUTPATIENT)
Dept: GENERAL RADIOLOGY | Age: 70
Discharge: HOME | End: 2020-12-03
Payer: MEDICARE

## 2020-12-01 ENCOUNTER — OFFICE VISIT (OUTPATIENT)
Dept: BREAST CENTER | Age: 70
End: 2020-12-01
Payer: MEDICARE

## 2020-12-01 VITALS
RESPIRATION RATE: 18 BRPM | WEIGHT: 150 LBS | HEIGHT: 64 IN | OXYGEN SATURATION: 96 % | SYSTOLIC BLOOD PRESSURE: 118 MMHG | TEMPERATURE: 98 F | BODY MASS INDEX: 25.61 KG/M2 | DIASTOLIC BLOOD PRESSURE: 60 MMHG | HEART RATE: 74 BPM

## 2020-12-01 PROCEDURE — 99213 OFFICE O/P EST LOW 20 MIN: CPT | Performed by: NURSE PRACTITIONER

## 2020-12-01 PROCEDURE — 3017F COLORECTAL CA SCREEN DOC REV: CPT | Performed by: NURSE PRACTITIONER

## 2020-12-01 PROCEDURE — G8484 FLU IMMUNIZE NO ADMIN: HCPCS | Performed by: NURSE PRACTITIONER

## 2020-12-01 PROCEDURE — 1090F PRES/ABSN URINE INCON ASSESS: CPT | Performed by: NURSE PRACTITIONER

## 2020-12-01 PROCEDURE — 1123F ACP DISCUSS/DSCN MKR DOCD: CPT | Performed by: NURSE PRACTITIONER

## 2020-12-01 PROCEDURE — 77063 BREAST TOMOSYNTHESIS BI: CPT

## 2020-12-01 PROCEDURE — G8417 CALC BMI ABV UP PARAM F/U: HCPCS | Performed by: NURSE PRACTITIONER

## 2020-12-01 PROCEDURE — 1036F TOBACCO NON-USER: CPT | Performed by: NURSE PRACTITIONER

## 2020-12-01 PROCEDURE — G8399 PT W/DXA RESULTS DOCUMENT: HCPCS | Performed by: NURSE PRACTITIONER

## 2020-12-01 PROCEDURE — G8427 DOCREV CUR MEDS BY ELIG CLIN: HCPCS | Performed by: NURSE PRACTITIONER

## 2020-12-01 PROCEDURE — 4040F PNEUMOC VAC/ADMIN/RCVD: CPT | Performed by: NURSE PRACTITIONER

## 2020-12-01 RX ORDER — FAMOTIDINE 20 MG/1
20 TABLET, FILM COATED ORAL 2 TIMES DAILY
COMMUNITY

## 2020-12-01 RX ORDER — MAGNESIUM OXIDE 400 MG/1
400 TABLET ORAL DAILY
COMMUNITY

## 2020-12-01 ASSESSMENT — ENCOUNTER SYMPTOMS: BACK PAIN: 0

## 2020-12-01 NOTE — PATIENT INSTRUCTIONS
RELEASE OF RESULTS AND RECORDS  As of October 1, 2020, all results (x-ray reports, labwork, pathology reports) will be released through [address] in real time per federal law. Once your test results are final, they will be automatically released to your electronic medical record [employer] where you will be able to see those results and any clinical notes associated with that result. In some cases, you may see those results and notes before your provider or the staff have had a chance to review. We will make every effort to contact you, especially about any abnormal or confusing results. If you view a result before we have contacted you, please wait up to one business day for us to reach you before calling with questions. If anything in your notes seems inaccurate, please message us through [employer] with potential corrections. If you see a term or language in your clinical note that doesn't make sense, please use the online health library reference tool in your MyChart (under the Health tab)  to help you interpret the note.       6 MOS FOLLOW UP. OBTAIN MRI PRIOR TO VISIT

## 2021-04-06 ENCOUNTER — TELEPHONE (OUTPATIENT)
Dept: BREAST CENTER | Age: 71
End: 2021-04-06

## 2021-04-09 ENCOUNTER — TELEPHONE (OUTPATIENT)
Dept: BREAST CENTER | Age: 71
End: 2021-04-09

## 2021-04-09 NOTE — TELEPHONE ENCOUNTER
Patient returned call stating she will be able to proceed with breast MRI this month. She will call us if she decides to go to a non-[facility] facility for lab work because we will have to fax the orders over and make sure we receive results. Patient aware we will obtain a prior authorization then the [address] [place] [address]  will call her to schedule.

## 2021-04-19 DIAGNOSIS — Z80.3 FAMILY HISTORY OF BREAST CANCER IN FIRST DEGREE RELATIVE: ICD-10-CM

## 2021-04-19 DIAGNOSIS — Z12.39 BREAST CANCER SCREENING, HIGH RISK PATIENT: ICD-10-CM

## 2021-04-19 DIAGNOSIS — Z85.3 PERSONAL HISTORY OF BREAST CANCER: ICD-10-CM

## 2021-04-19 DIAGNOSIS — Z91.89 AT HIGH RISK FOR BREAST CANCER: ICD-10-CM

## 2021-04-19 LAB
BUN BLDV-MCNC: 16 MG/DL (ref 8–23)
CREAT SERPL-MCNC: 0.8 MG/DL (ref 0.5–1)
GFR AFRICAN AMERICAN: >60
GFR NON-AFRICAN AMERICAN: >60 ML/MIN/1.73

## 2021-04-22 ENCOUNTER — HOSPITAL ENCOUNTER (OUTPATIENT)
Dept: MRI IMAGING | Age: 71
Discharge: HOME | End: 2021-04-24
Payer: MEDICARE

## 2021-04-22 DIAGNOSIS — Z12.39 BREAST CANCER SCREENING, HIGH RISK PATIENT: ICD-10-CM

## 2021-04-22 DIAGNOSIS — Z80.3 FAMILY HISTORY OF BREAST CANCER IN FIRST DEGREE RELATIVE: ICD-10-CM

## 2021-04-22 DIAGNOSIS — Z85.3 PERSONAL HISTORY OF MALIGNANT NEOPLASM OF BREAST: ICD-10-CM

## 2021-04-22 DIAGNOSIS — Z91.89 AT HIGH RISK FOR BREAST CANCER: ICD-10-CM

## 2021-04-22 PROCEDURE — 6360000004 HC RX CONTRAST MEDICATION: Performed by: RADIOLOGY

## 2021-04-22 PROCEDURE — A9585 GADOBUTROL INJECTION: HCPCS | Performed by: RADIOLOGY

## 2021-04-22 PROCEDURE — 77049 MRI BREAST C-+ W/CAD BI: CPT

## 2021-04-22 RX ADMIN — GADOBUTROL 7 ML: 604.72 INJECTION INTRAVENOUS at 12:51

## 2021-05-27 ASSESSMENT — ENCOUNTER SYMPTOMS
BACK PAIN: 0
COUGH: 0
NAUSEA: 0
ABDOMINAL DISTENTION: 0
SINUS PAIN: 0
SINUS PRESSURE: 0
RHINORRHEA: 0
CHEST TIGHTNESS: 0
ABDOMINAL PAIN: 0
EYE DISCHARGE: 0
SHORTNESS OF BREATH: 0
BLOOD IN STOOL: 0
WHEEZING: 0
EYE ITCHING: 0
TROUBLE SWALLOWING: 0
CHOKING: 0
CONSTIPATION: 0
SORE THROAT: 0
VOMITING: 0
DIARRHEA: 0
VOICE CHANGE: 0

## 2021-05-27 NOTE — PROGRESS NOTES
Subjective:  Hx pre-menopausal Right IDC in 1991;  Treated with lumpectomy, requiring re-excision. Reports axillary LN were negative. -Received adjuvant Chemotherapy (?TC) ×4 cycles at [facility].   -XRT X 6 weeks.    -Endocrine therapy with Tamoxifen, but only took it for 1 year; on Raloxifene since that time. This note was copied forward from the last encounter. Essential components for this patient record were reviewed and verified on this visit including:  recent hospitalizations, recent imaging, PMH, PSH, FH, SOC HX, Allergies, and Medications were reviewed and updated as appropriate. In addition, the assessment and plan were copied from prior office note and updated accordingly. Patient ID: Casey Peguero is a 70 y.o. female. HPI     History and Physical    June 8, 2021      Patient's Name/Date of Birth: Casey Peguero / 1950      Casey Peguero presents to establish care. PCP: Lulu Garcia MD.    70 y.o. female with PMH pre-menopausal breast cancer in 46 at the age of 37. IDC, (T1) right breast per her records in 1993, s/p lumpectomy with LND per Dr. Phi Davies @ [facility] (reported as negative lymph nodes) Chemotherapy and Radiation therapy followed by endocrine therapy with Tamoxifen, however she only took it for 1 years due to poor tolerance; Continues on Raloxifene since that time. Pathology report brought by [facility] on this visit:                -History of Leiomyoma 1999 post [facility]    Estimated body mass index is 25.75 kg/m² as calculated from the following:    Height as of 12/1/20: 5' 4\" (1.626 m). Weight as of 12/1/20: 150 lb (68 kg). Bra Size: 32DD on Left; 32 C    Additional Breast cancer risk factors include age, gender, personal history of breast cancer; Mother with breast cancer in her 63's with reoccurrence in her 66's;  Sister with breast cancer in her 63's; Paternal grandmother with Pancreatic cancer. Age of menarche was 15.  Age of menopause was 1994 due to chemotherapy. Patient denies hormonal therapy. Patient is . Age of first live birth was 32;  Patient did breast feed. Patient drinks little caffeinated beverages. She does not smoke cigarettes. Quit . Past Medical History:   Diagnosis Date    Arthritis     Breast cancer ([facility] [address]     infiltrating ductal  right breast    Hallux rigidus of right foot 2017    Neuroma of foot     great toe    PONV (postoperative nausea and vomiting)        Past Surgical History:   Procedure Laterality Date    APPENDECTOMY      BREAST SURGERY Right     lumpectomy    COLONOSCOPY  2016    HYSTERECTOMY      JOINT REPLACEMENT Left     knee, left    LAMINECTOMY  09/01/2015    x2    LUMBAR FUSION      L4-L5 X2    OVARIAN CYST SURGERY      PRE-MALIGNANT / BENIGN SKIN LESION EXCISION      squamous cell     TOE FUSION Right 2017    Right 1st Metatarsophalangeal Joint fusion right great toe neuroma resection    TOE SURGERY Right     great toe    TONSILLECTOMY         Current Outpatient Medications   Medication Sig Dispense Refill    famotidine (PEPCID) 20 MG tablet Take 20 mg by mouth 2 times daily      magnesium oxide (MAG-OX) 400 MG tablet Take 400 mg by mouth daily      loratadine (CLARITIN) 10 MG tablet Take 10 mg by mouth daily      Biotin 5000 MCG TABS Take by mouth daily      traMADol (ULTRAM) 50 MG tablet Take 50 mg by mouth every 6 hours as needed for Pain      celecoxib (CELEBREX) 100 MG capsule Take 100 mg by mouth      tiZANidine (ZANAFLEX) 4 MG tablet Take 4 mg by mouth 2 times daily      calcium carbonate (TUMS) 500 MG chewable tablet Take 1 tablet by mouth as needed for Heartburn      raloxifene (EVISTA) 60 MG tablet Take 60 mg by mouth daily        No current facility-administered medications for this visit.      Facility-Administered Medications Ordered in Other Visits   Medication Dose Route Frequency Provider Last Rate Last Admin    gadobutrol (GADAVIST) injection 6 mL  6 mL Intravenous ONCE PRN Janit Ihsan, DO           Allergies   Allergen Reactions    Tape P[employer]rez [employer] [place] Dermatitis     paper           Social History     Socioeconomic History    Marital status:      Spouse name: Not on file    Number of children: Not on file    Years of education: Not on file    Highest education level: Not on file   Occupational History    Not on file   Tobacco Use    Smoking status: Former Smoker     Packs/day: 1.00     Years: 9.00     Pack years: 9.00     Types: Cigarettes     Quit date: 1977     Years since quittin.3    Smokeless tobacco: Never Used   Vaping Use    Vaping Use: Never used   Substance and Sexual Activity    Alcohol use: No     Alcohol/week: 0.0 standard drinks     Comment: socially    Drug use: No    Sexual activity: Yes     Partners: Male   Other Topics Concern    Not on file   Social History Narrative    Not on file     Social Determinants of Health     Financial Resource Strain:     Difficulty of Paying Living Expenses:    Food Insecurity:     Worried About Running Out of Food in the Last Year:     Ran Out of Food in the Last Year:    Transportation Needs:     Lack of Transportation (Medical):  Lack of Transportation (Non-Medical):    Physical Activity:     Days of Exercise per Week:     Minutes of Exercise per Session:    Stress:     Feeling of Stress :    Social Connections:     Frequency of Communication with Friends and Family:     Frequency of Social Gatherings with Friends and Family:     Attends [employer] Services:     Active Member of Clubs or Organizations:     Attends Club or Organization Meetings:     Marital Status:    Intimate Partner Violence:     Fear of Current or Ex-Partner:     Emotionally Abused:     Physically Abused:     Sexually Abused:        Occupation: Retired Nurse.       Review of Systems   Constitutional: Negative for activity change, appetite change, chills, fatigue, fever and unexpected weight change. She reports she is generally feeling well. She brought her records from her initial breast cancer diagnosis and we scanned them to her chart today. HENT: Negative for congestion, postnasal drip, rhinorrhea, sinus pressure, sinus pain, sore throat, trouble swallowing and voice change. Eyes: Negative for discharge, itching and visual disturbance. Respiratory: Negative for cough, choking, chest tightness, shortness of breath and wheezing. Cardiovascular: Negative for chest pain, palpitations and leg swelling. Gastrointestinal: Negative for abdominal distention, abdominal pain, blood in stool, constipation, diarrhea, nausea and vomiting. Diverticulosis on colonoscopy; constipation controlled with AM fiber. Endocrine: Negative for cold intolerance and heat intolerance. Genitourinary: Negative for difficulty urinating, dysuria, frequency and hematuria. Musculoskeletal: Negative for arthralgias, back pain, gait problem, joint swelling, myalgias, neck pain and neck stiffness. Chronic back pain, post fusions. Right foot drop due to back pain. Overall her pain remains stable. Allergic/Immunologic: Negative for environmental allergies and food allergies. Neurological: Negative for dizziness, seizures, syncope, speech difficulty, weakness, light-headedness and headaches. Hematological: Negative for adenopathy. Does not bruise/bleed easily. Psychiatric/Behavioral: Negative for agitation, confusion and decreased concentration. The patient is not nervous/anxious. Objective:   Physical Exam  Vitals and nursing note reviewed. Constitutional:       General: She is not in acute distress. Appearance: She is well-developed. She is not diaphoretic. Comments: ECOG remains stable at 0; pleasant and conversant. HENT:      Head: Normocephalic and atraumatic. Mouth/Throat:      Pharynx: No oropharyngeal exudate.    Eyes:      General: No scleral icterus. Right eye: No discharge. Left eye: No discharge. Conjunctiva/sclera: Conjunctivae normal.   Neck:      Thyroid: No thyromegaly. Vascular: No JVD. Trachea: No tracheal deviation. Cardiovascular:      Rate and Rhythm: Normal rate and regular rhythm. Heart sounds: No murmur heard. No friction rub. No gallop. Pulmonary:      Effort: Pulmonary effort is normal. No respiratory distress or retractions. Breath sounds: Normal breath sounds. No stridor. No wheezing or rales. Chest:      Chest wall: No mass, lacerations, deformity, swelling, tenderness or edema. Breasts: Breasts are asymmetrical (due to post treatment changes. ). Right: No inverted nipple, mass, nipple discharge, skin change or tenderness. Left: No inverted nipple, mass, nipple discharge, skin change or tenderness. Comments: Mild, stable, skin thickening on the right c/w post treatment changes. Scars intact. Otherwise, left breast is supple. Her breast exam remains stable with no skin dimpling or puckering. No nipple discharge. No clinically suspicious lumps nodules or masses appreciated. No axillary lymphadenopathy. Abdominal:      General: There is no distension. Palpations: Abdomen is soft. Tenderness: There is no abdominal tenderness. There is no guarding or rebound. Musculoskeletal:         General: No tenderness or deformity. Normal range of motion. Right shoulder: Normal.      Left shoulder: Normal.      Cervical back: Normal range of motion and neck supple. Lymphadenopathy:      Cervical: No cervical adenopathy. Right cervical: No superficial, deep or posterior cervical adenopathy. Left cervical: No superficial, deep or posterior cervical adenopathy. Upper Body:      Right upper body: No pectoral adenopathy. Left upper body: No pectoral adenopathy. Skin:     General: Skin is warm and dry.       Coloration: Skin is not pale. Findings: No erythema or rash. Neurological:      Mental Status: She is alert and oriented to person, place, and time. Coordination: Coordination normal.   Psychiatric:         Behavior: Behavior normal.         Thought Content: Thought content normal.         Judgment: Judgment normal.          Assessment:      70 y.o. female with history of Premenopausal (age 37) IDC, (T1) right breast per her records in 1993,   s/p lumpectomy with LND per Dr. Marbin Castellano @ [facility] (reported as negative lymph nodes). Per Dr. Chen Dunlap:       Chemotherapy and Radiation therapy followed by endocrine therapy with Tamoxifen, however she only took it for 1 year due to poor tolerance; Continues on Raloxifene since that time. Pathology report from [place] requested, but no longer available. Breast cancer risk factors include age, gender, personal history of breast cancer; Mother with breast cancer in her 63's with re-occurrence in her 66's;  Sister with breast cancer in her 63's; Paternal grandmother with Pancreatic cancer. She presented 09/24/2018 to establish care.    -A bilateral screening mammogram 09/24/2018:  Negative. -LFT's 08/22/2018 were essentially normal.      -On 12/05/2018 [email or web address] Genetic test: BRCA-1 VUS. Seen by Plastics and Reconstructive surgery, Dr. Jocy Viramontes on 10/11/2018 for asymmetry; she opted to forgo surgery at this time. Bilateral screening mammogram 09/25/2019:    Finding 1:   There is a focal asymmetry seen in the posterior central region of the left breast.       IMPRESSION:   Focal asymmetry in the left breast requires additional evaluation. An ultrasound exam is recommended.       =======================================   BI-RADS Category 0:  Incomplete: Need Additional Imaging Evaluation     Left breast US 09/25/2019: Additional evaluation for the focal asymmetry and the left breast seen on mammogram dated 9/25/2019. No suspicious solid or cystic mass.   Negative, BI-RADS  1    She reports that her Sister and her Niece also had genetic testing and all of them were also found to have BRCA-1 VUS. Reviewed screening guidelines as recommended by [employer] (recommending high risk protocol). -High risk screening per protocol:    -03/06/2020 MRI bilateral breasts:  Benign findings (BI-RADS-2).   -12/01/2020 B/L screening mammogram:  Negative, BI-RADS 1.     -On Raloxifene for bone health per PCP. No clinically suspicious findings.  -04/22/2021 MRI of the bilateral breast: Negative, BI-RADS 1.  -06/08/2021 clinical follow-up she is without evidence of recurrent disease or malignancy. She has had a colonoscopy in the past 1 year (no ployps); EGD in the past 5 years. Hx hysterectomy (1ovary left in due to lack of visualization). Plan:      1. Continue monthly breast self examination; detailed instructions reviewed today. Bring any changes to your physician's attention. 2. Continue healthy diet and exercise routinely as tolerated. 3. Avoid alcohol or limit alcohol intake to < 3 drinks per week. 4. Limit caffeine intake. 5. Repeat mammogram December 12/02/2021 or later  6. Continue follow up with Primary Care. 7. Repeat MRI of the bilateral breast June 2022  8. RTC 6 months with screening mammogram bilateral breasts prior. During today's visit, face-to-face time 25 minutes, greater than 50% in counseling education, review of records, and coordination of care. All questions were answered to her apparent satisfaction, and she is agreeable to the plan as outlined above. Britta Amaral, RN, MSN, ACNP-BC, AOCNP  Advanced Oncology Certified Nurse Practitioner  Department of Breast Surgery  [facility]/  [facility] in collaboration with Dr. Alejandra Ritchie.  Kalman Dandy APRN-CNP

## 2021-06-08 ENCOUNTER — OFFICE VISIT (OUTPATIENT)
Dept: BREAST CENTER | Age: 71
End: 2021-06-08
Payer: MEDICARE

## 2021-06-08 VITALS
HEART RATE: 78 BPM | SYSTOLIC BLOOD PRESSURE: 116 MMHG | OXYGEN SATURATION: 97 % | DIASTOLIC BLOOD PRESSURE: 60 MMHG | HEIGHT: 64 IN | TEMPERATURE: 97.9 F | RESPIRATION RATE: 18 BRPM | BODY MASS INDEX: 26.46 KG/M2 | WEIGHT: 155 LBS

## 2021-06-08 DIAGNOSIS — Z12.31 VISIT FOR SCREENING MAMMOGRAM: Primary | ICD-10-CM

## 2021-06-08 PROCEDURE — 3017F COLORECTAL CA SCREEN DOC REV: CPT | Performed by: NURSE PRACTITIONER

## 2021-06-08 PROCEDURE — G8427 DOCREV CUR MEDS BY ELIG CLIN: HCPCS | Performed by: NURSE PRACTITIONER

## 2021-06-08 PROCEDURE — 99213 OFFICE O/P EST LOW 20 MIN: CPT | Performed by: NURSE PRACTITIONER

## 2021-06-08 PROCEDURE — G8399 PT W/DXA RESULTS DOCUMENT: HCPCS | Performed by: NURSE PRACTITIONER

## 2021-06-08 PROCEDURE — 1036F TOBACCO NON-USER: CPT | Performed by: NURSE PRACTITIONER

## 2021-06-08 PROCEDURE — 1123F ACP DISCUSS/DSCN MKR DOCD: CPT | Performed by: NURSE PRACTITIONER

## 2021-06-08 PROCEDURE — 1090F PRES/ABSN URINE INCON ASSESS: CPT | Performed by: NURSE PRACTITIONER

## 2021-06-08 PROCEDURE — 4040F PNEUMOC VAC/ADMIN/RCVD: CPT | Performed by: NURSE PRACTITIONER

## 2021-06-08 PROCEDURE — G8417 CALC BMI ABV UP PARAM F/U: HCPCS | Performed by: NURSE PRACTITIONER

## 2021-06-08 RX ORDER — ASPIRIN 81 MG/1
81 TABLET ORAL DAILY
Status: ON HOLD | COMMUNITY
End: 2022-08-08 | Stop reason: HOSPADM

## 2021-06-08 RX ORDER — ATORVASTATIN CALCIUM 10 MG/1
10 TABLET, FILM COATED ORAL DAILY
COMMUNITY

## 2021-06-08 RX ORDER — WHEAT DEXTRIN 3 G/3.8 G
4 POWDER (GRAM) ORAL
COMMUNITY

## 2021-06-08 RX ORDER — METOPROLOL SUCCINATE 25 MG/1
25 TABLET, EXTENDED RELEASE ORAL DAILY
COMMUNITY

## 2021-06-08 NOTE — PATIENT INSTRUCTIONS

## 2021-07-06 ENCOUNTER — HOSPITAL ENCOUNTER (OUTPATIENT)
Age: 71
Discharge: HOME | End: 2021-07-08
Payer: MEDICARE

## 2021-07-06 ENCOUNTER — HOSPITAL ENCOUNTER (OUTPATIENT)
Dept: GENERAL RADIOLOGY | Age: 71
Discharge: HOME | End: 2021-07-08
Payer: MEDICARE

## 2021-07-06 DIAGNOSIS — R52 PAIN: ICD-10-CM

## 2021-07-06 PROCEDURE — 73502 X-RAY EXAM HIP UNI 2-3 VIEWS: CPT

## 2021-12-03 ASSESSMENT — ENCOUNTER SYMPTOMS
EYE DISCHARGE: 0
SINUS PAIN: 0
SINUS PRESSURE: 0
COUGH: 0
WHEEZING: 0
CHOKING: 0
SORE THROAT: 0
BLOOD IN STOOL: 0
VOICE CHANGE: 0
TROUBLE SWALLOWING: 0
ABDOMINAL DISTENTION: 0
BACK PAIN: 0
VOMITING: 0
CHEST TIGHTNESS: 0
EYE ITCHING: 0
RHINORRHEA: 0
DIARRHEA: 0
NAUSEA: 0
ABDOMINAL PAIN: 0
SHORTNESS OF BREATH: 0
CONSTIPATION: 0

## 2021-12-03 NOTE — PROGRESS NOTES
Subjective:  Hx pre-menopausal Right IDC in 1991;  Treated with lumpectomy, requiring re-excision. Reports axillary LN were negative. -Received adjuvant Chemotherapy (?TC) ×4 cycles at [facility].   -XRT X 6 weeks.    -Endocrine therapy with Tamoxifen, but only took it for 1 year; off of Evista as of 09/2021. This note was copied forward from the last encounter. Essential components for this patient record were reviewed and verified on this visit including:  recent hospitalizations, recent imaging, PMH, PSH, FH, SOC HX, Allergies, and Medications were reviewed and updated as appropriate. In addition, the assessment and plan were copied from prior office note and updated accordingly. Patient ID: Alyssa Dominguez is a 70 y.o. female. HPI   History and Physical    Patient's Name/Date of Birth: Alyssa Dominguez / 1950      Alyssa Dominguez presents for follow up. PCP: Tian Perez MD.    70 y.o. female with PMH pre-menopausal breast cancer in 46 at the age of 37. IDC, (T1) right breast per her records in 1993, s/p lumpectomy with LND per Dr. Renetta Mendez @ [facility] (reported as negative lymph nodes) Chemotherapy and Radiation therapy followed by endocrine therapy with Tamoxifen, however she only took it for 1 years due to poor tolerance; Took   Evista until September 2021. Discontinued Evista after pelvic fracture from a fall. Pathology report brought by [facility] on this visit:                -History of Leiomyoma 1999 post TASO    Estimated body mass index is 26.61 kg/m² as calculated from the following:    Height as of 6/8/21: 5' 4\" (1.626 m). Weight as of 6/8/21: 155 lb (70.3 kg). Bra Size: 32DD on Left; 32 C    Additional Breast cancer risk factors include age, gender, personal history of breast cancer; Mother with breast cancer in her 63's with reoccurrence in her 66's;  Sister with breast cancer in her 63's; Paternal grandmother with Pancreatic cancer.     Age of menarche 1 tablet by mouth as needed for Heartburn      raloxifene (EVISTA) 60 MG tablet Take 60 mg by mouth daily        No current facility-administered medications for this visit. Facility-Administered Medications Ordered in Other Visits   Medication Dose Route Frequency Provider Last Rate Last Admin    gadobutrol (GADAVIST) injection 6 mL  6 mL IntraVENous ONCE PRN Donivan Blotter, DO           Allergies   Allergen Reactions    Tape Ricke Guard Tape] Dermatitis     paper           Social History     Socioeconomic History    Marital status:      Spouse name: Not on file    Number of children: Not on file    Years of education: Not on file    Highest education level: Not on file   Occupational History    Not on file   Tobacco Use    Smoking status: Former Smoker     Packs/day: 1.00     Years: 9.00     Pack years: 9.00     Types: Cigarettes     Quit date: 1977     Years since quittin.8    Smokeless tobacco: Never Used   Vaping Use    Vaping Use: Never used   Substance and Sexual Activity    Alcohol use: No     Alcohol/week: 0.0 standard drinks     Comment: socially    Drug use: No    Sexual activity: Yes     Partners: Male   Other Topics Concern    Not on file   Social History Narrative    Not on file     Social Determinants of Health     Financial Resource Strain:     Difficulty of Paying Living Expenses: Not on file   Food Insecurity:     Worried About Running Out of Food in the Last Year: Not on file    [place] of Food in the Last Year: Not on file   Transportation Needs:     Lack of Transportation (Medical): Not on file    Lack of Transportation (Non-Medical):  Not on file   Physical Activity:     Days of Exercise per Week: Not on file    Minutes of Exercise per Session: Not on file   Stress:     Feeling of Stress : Not on file   Social Connections:     Frequency of Communication with Friends and Family: Not on file    Frequency of Social Gatherings with Friends and Family: Not on file    Attends Adventism Services: Not on file    Active Member of Clubs or Organizations: Not on file    Attends Club or Organization Meetings: Not on file    Marital Status: Not on file   Intimate Partner Violence:     Fear of Current or Ex-Partner: Not on file    Emotionally Abused: Not on file    Physically Abused: Not on file    Sexually Abused: Not on file   Housing Stability:     Unable to Pay for Housing in the Last Year: Not on file    Number of [place] in the Last Year: Not on file    Unstable Housing in the Last Year: Not on file       Occupation: Retired Nurse. Review of Systems   Constitutional: Negative for activity change, appetite change, chills, fatigue, fever and unexpected weight change. She reports she is generally feeling well. She fell at her nephew's house in the spring 2021 and in September 2021 was found to have a fracture of the left pelvis. HENT: Negative for congestion, postnasal drip, rhinorrhea, sinus pressure, sinus pain, sore throat, trouble swallowing and voice change. Eyes: Negative for discharge, itching and visual disturbance. Respiratory: Negative for cough, choking, chest tightness, shortness of breath and wheezing. Cardiovascular: Negative for chest pain, palpitations and leg swelling. Gastrointestinal: Negative for abdominal distention, abdominal pain, blood in stool, constipation, diarrhea, nausea and vomiting. Diverticulosis on colonoscopy; constipation controlled with AM fiber. She has significant, chronic issues with reflux. Her last EGD was in 2020. She reports symptoms are controlled with Pepcid 20 mg twice daily. Endocrine: Negative for cold intolerance and heat intolerance. Genitourinary: Negative for difficulty urinating, dysuria, frequency and hematuria. Musculoskeletal: Negative for arthralgias, back pain, gait problem, joint swelling, myalgias, neck pain and neck stiffness.         Chronic back pain, post fusions. Right foot drop due to back pain. Overall her pain remains stable. May 2021 fell and fractured left hip; she reports that is gradually improving. Allergic/Immunologic: Negative for environmental allergies and food allergies. Neurological: Negative for dizziness, seizures, syncope, speech difficulty, weakness, light-headedness and headaches. Hematological: Negative for adenopathy. Does not bruise/bleed easily. Psychiatric/Behavioral: Negative for agitation, confusion and decreased concentration. The patient is not nervous/anxious. Objective:   Physical Exam  Vitals and nursing note reviewed. Constitutional:       General: She is not in acute distress. Appearance: Normal appearance. She is well-developed. She is not diaphoretic. Comments: ECOG once again remains stable at 0; pleasant and conversant. HENT:      Head: Normocephalic and atraumatic. Mouth/Throat:      Pharynx: No oropharyngeal exudate. Eyes:      General: No scleral icterus. Right eye: No discharge. Left eye: No discharge. Conjunctiva/sclera: Conjunctivae normal.   Neck:      Thyroid: No thyromegaly. Vascular: No JVD. Trachea: No tracheal deviation. Cardiovascular:      Rate and Rhythm: Normal rate and regular rhythm. Heart sounds: No murmur heard. No friction rub. No gallop. Pulmonary:      Effort: Pulmonary effort is normal. No respiratory distress or retractions. Breath sounds: Normal breath sounds. No stridor. No wheezing or rales. Chest:      Chest wall: No mass, lacerations, deformity, swelling, tenderness or edema. Breasts:      Breasts are asymmetrical (due to post treatment changes. ). Right: No inverted nipple, mass, nipple discharge, skin change or tenderness. Left: No inverted nipple, mass, nipple discharge, skin change or tenderness. Comments: Mild, stable, skin thickening on the right c/w post treatment changes.   Scars intact. Her exam remains stable and there is no evidence of recurrent disease. The left breast exam is unremarkable. Abdominal:      General: There is no distension. Palpations: Abdomen is soft. Tenderness: There is no abdominal tenderness. There is no guarding or rebound. Musculoskeletal:         General: No tenderness or deformity. Normal range of motion. Right shoulder: Normal.      Left shoulder: Normal.      Cervical back: Normal range of motion and neck supple. Lymphadenopathy:      Cervical: No cervical adenopathy. Right cervical: No superficial, deep or posterior cervical adenopathy. Left cervical: No superficial, deep or posterior cervical adenopathy. Upper Body:      Right upper body: No pectoral adenopathy. Left upper body: No pectoral adenopathy. Skin:     General: Skin is warm and dry. Coloration: Skin is not pale. Findings: No erythema or rash. Neurological:      Mental Status: She is alert and oriented to person, place, and time. Coordination: Coordination normal.   Psychiatric:         Behavior: Behavior normal.         Thought Content: Thought content normal.         Judgment: Judgment normal.          Assessment:      70 y.o. female with history of Premenopausal (age 37) IDC, (T1) right breast per her records in 1993,   s/p lumpectomy with LND per Dr[facility] Jerrell Z[facility] (reported as negative lymph nodes). Per Dr. Feliciano Castrejon:       Chemotherapy and Radiation therapy followed by endocrine therapy with Tamoxifen, however she only took it for 1 year due to poor tolerance; Continues on Raloxifene since that time. Pathology report from [facility] requested, but no longer available. Breast cancer risk factors include age, gender, personal history of breast cancer; Mother with breast cancer in her 63's with re-occurrence in her 66's;  Sister with breast cancer in her 63's; Paternal grandmother with Pancreatic cancer.     She presented 09/24/2018 to establish care.    -A bilateral screening mammogram 09/24/2018:  Negative. -LFT's 08/22/2018 were essentially normal.      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 Seen by Plastics and Reconstructive surgery, Dr. Jagruti Marshall on 10/11/2018 for asymmetry; she opted to forgo surgery at this time. Bilateral screening mammogram 09/25/2019:    Finding 1:   There is a focal asymmetry seen in the posterior central region of the left breast.       IMPRESSION:   Focal asymmetry in the left breast requires additional evaluation. An ultrasound exam is recommended.       =======================================   BI-RADS Category 0:  Incomplete: Need Additional Imaging Evaluation     Left breast US 09/25/2019: Additional evaluation for the focal asymmetry and the left breast seen on mammogram dated 9/25/2019. No suspicious solid or cystic mass. Negative, BI-RADS  1    She reports that her Sister and her Niece also had genetic testing and all of them were also found to have BRCA-1 VUS. Reviewed screening guidelines as recommended by [employer] (recommending high risk protocol). -High risk screening per protocol:    -03/06/2020 MRI bilateral breasts:  Benign findings (BI-RADS-2).   -12/01/2020 B/L screening mammogram:  Negative, BI-RADS 1.     -On Raloxifene for bone health per PCP. No clinically suspicious findings.  -04/22/2021 MRI of the bilateral breast: Negative, BI-RADS 1.  -06/08/2021 clinical follow-up she is without evidence of recurrent disease or malignancy. She has had a colonoscopy in the past 1 year (no ployps); EGD in the past 5 years. Hx hysterectomy (1ovary left in due to lack of visualization). -12/06/2021 Bilateral screening mammogram: Negative, BI-RADS 1.  -12/06/2021 clinical follow up clinical follow-up is without evidence of recurrent disease. We reviewed her genetic testing of BRCA1-VUS.   We reviewed her family history, she reports her grandmother had pancreatic

## 2021-12-06 ENCOUNTER — HOSPITAL ENCOUNTER (OUTPATIENT)
Dept: GENERAL RADIOLOGY | Age: 71
Discharge: HOME | End: 2021-12-08
Payer: MEDICARE

## 2021-12-06 ENCOUNTER — OFFICE VISIT (OUTPATIENT)
Dept: BREAST CENTER | Age: 71
End: 2021-12-06
Payer: MEDICARE

## 2021-12-06 VITALS
DIASTOLIC BLOOD PRESSURE: 60 MMHG | TEMPERATURE: 97.4 F | RESPIRATION RATE: 20 BRPM | OXYGEN SATURATION: 99 % | HEART RATE: 89 BPM | WEIGHT: 150 LBS | BODY MASS INDEX: 25.61 KG/M2 | SYSTOLIC BLOOD PRESSURE: 118 MMHG | HEIGHT: 64 IN

## 2021-12-06 DIAGNOSIS — Z85.3 PERSONAL HISTORY OF MALIGNANT NEOPLASM OF BREAST: ICD-10-CM

## 2021-12-06 DIAGNOSIS — Z91.89 AT HIGH RISK FOR BREAST CANCER: Primary | ICD-10-CM

## 2021-12-06 DIAGNOSIS — Z12.31 VISIT FOR SCREENING MAMMOGRAM: ICD-10-CM

## 2021-12-06 DIAGNOSIS — Z80.3 FAMILY HISTORY OF BREAST CANCER IN FIRST DEGREE RELATIVE: ICD-10-CM

## 2021-12-06 DIAGNOSIS — Z01.818 PREOP TESTING: ICD-10-CM

## 2021-12-06 DIAGNOSIS — Z12.39 BREAST CANCER SCREENING, HIGH RISK PATIENT: ICD-10-CM

## 2021-12-06 DIAGNOSIS — Z85.3 HISTORY OF BREAST CANCER: ICD-10-CM

## 2021-12-06 PROCEDURE — 1123F ACP DISCUSS/DSCN MKR DOCD: CPT | Performed by: NURSE PRACTITIONER

## 2021-12-06 PROCEDURE — G8484 FLU IMMUNIZE NO ADMIN: HCPCS | Performed by: NURSE PRACTITIONER

## 2021-12-06 PROCEDURE — G8399 PT W/DXA RESULTS DOCUMENT: HCPCS | Performed by: NURSE PRACTITIONER

## 2021-12-06 PROCEDURE — 99213 OFFICE O/P EST LOW 20 MIN: CPT | Performed by: NURSE PRACTITIONER

## 2021-12-06 PROCEDURE — 77063 BREAST TOMOSYNTHESIS BI: CPT

## 2021-12-06 PROCEDURE — 4040F PNEUMOC VAC/ADMIN/RCVD: CPT | Performed by: NURSE PRACTITIONER

## 2021-12-06 PROCEDURE — 1036F TOBACCO NON-USER: CPT | Performed by: NURSE PRACTITIONER

## 2021-12-06 PROCEDURE — 3017F COLORECTAL CA SCREEN DOC REV: CPT | Performed by: NURSE PRACTITIONER

## 2021-12-06 PROCEDURE — G8427 DOCREV CUR MEDS BY ELIG CLIN: HCPCS | Performed by: NURSE PRACTITIONER

## 2021-12-06 PROCEDURE — 1090F PRES/ABSN URINE INCON ASSESS: CPT | Performed by: NURSE PRACTITIONER

## 2021-12-06 PROCEDURE — G8417 CALC BMI ABV UP PARAM F/U: HCPCS | Performed by: NURSE PRACTITIONER

## 2021-12-06 RX ORDER — TEMAZEPAM 15 MG/1
15 CAPSULE ORAL NIGHTLY PRN
COMMUNITY

## 2021-12-06 RX ORDER — CALCIUM CARBONATE 500(1250)
500 TABLET ORAL DAILY
COMMUNITY
End: 2022-06-23

## 2021-12-06 NOTE — PATIENT INSTRUCTIONS
RELEASE OF RESULTS AND RECORDS  As of October 1, 2020, all results (x-ray reports, labwork, pathology reports) will be released through [address] in real time per federal law. Once your test results are final, they will be automatically released to your electronic medical record [employer] [facility] where you will be able to see those results and any clinical notes associated with that result. In some cases, you may see those results and notes before your provider or the staff have had a chance to review. We will make every effort to contact you, especially about any abnormal or confusing results. If you view a result before we have contacted you, please wait up to one business day for us to reach you before calling with questions. If anything in your notes seems inaccurate, please message us through [employer] with potential corrections. If you see a term or language in your clinical note that doesn't make sense, please use the online health library reference tool in your MyChart (under the Health tab)  to help you interpret the note.       Our office will contact patient in June to schedule breast MRI

## 2022-05-23 ENCOUNTER — HOSPITAL ENCOUNTER (OUTPATIENT)
Dept: MAMMOGRAPHY | Age: 72
Discharge: HOME | End: 2022-05-25
Payer: MEDICARE

## 2022-05-23 DIAGNOSIS — Z13.820 ENCOUNTER FOR IMAGING TO ASSESS OSTEOPOROSIS: ICD-10-CM

## 2022-05-23 DIAGNOSIS — M81.0 POSTMENOPAUSAL OSTEOPOROSIS: ICD-10-CM

## 2022-05-23 PROCEDURE — 77080 DXA BONE DENSITY AXIAL: CPT

## 2022-05-31 ENCOUNTER — HOSPITAL ENCOUNTER (OUTPATIENT)
Dept: MRI IMAGING | Age: 72
Discharge: HOME | End: 2022-06-02
Payer: MEDICARE

## 2022-05-31 DIAGNOSIS — Z12.39 BREAST CANCER SCREENING, HIGH RISK PATIENT: ICD-10-CM

## 2022-05-31 DIAGNOSIS — Z91.89 AT HIGH RISK FOR BREAST CANCER: ICD-10-CM

## 2022-05-31 DIAGNOSIS — Z85.3 PERSONAL HISTORY OF MALIGNANT NEOPLASM OF BREAST: ICD-10-CM

## 2022-05-31 DIAGNOSIS — Z80.3 FAMILY HISTORY OF BREAST CANCER IN FIRST DEGREE RELATIVE: ICD-10-CM

## 2022-05-31 PROCEDURE — 6360000004 HC RX CONTRAST MEDICATION: Performed by: RADIOLOGY

## 2022-05-31 PROCEDURE — A9585 GADOBUTROL INJECTION: HCPCS | Performed by: RADIOLOGY

## 2022-05-31 PROCEDURE — 77049 MRI BREAST C-+ W/CAD BI: CPT

## 2022-05-31 RX ADMIN — GADOBUTROL 7 ML: 604.72 INJECTION INTRAVENOUS at 13:17

## 2022-06-16 ENCOUNTER — TELEPHONE (OUTPATIENT)
Dept: BREAST CENTER | Age: 72
End: 2022-06-16

## 2022-06-16 NOTE — TELEPHONE ENCOUNTER
Patient rescheduled. She had been around her son who developed COVID. Patient remains without symptoms and will call  With any changes.

## 2022-06-17 ASSESSMENT — ENCOUNTER SYMPTOMS
SHORTNESS OF BREATH: 0
CONSTIPATION: 0
EYE DISCHARGE: 0
EYE ITCHING: 0
VOMITING: 0
DIARRHEA: 0
BLOOD IN STOOL: 0
COUGH: 0
NAUSEA: 0

## 2022-06-17 NOTE — PROGRESS NOTES
Subjective:  Hx pre-menopausal Right IDC in 1991;  Treated with lumpectomy, requiring re-excision. Reports axillary LN were negative. -Received adjuvant Chemotherapy (?TC) ×4 cycles at [facility].   -XRT X 6 weeks.    -Endocrine therapy with Tamoxifen, but only took it for 1 year; off of Evista as of 09/2021. This note was copied forward from the last encounter. Essential components for this patient record were reviewed and verified on this visit including:  recent hospitalizations, recent imaging, PMH, PSH, FH, SOC HX, Allergies, and Medications were reviewed and updated as appropriate. In addition, the assessment and plan were copied from prior office note and updated accordingly. Patient ID: Yamilet Ortega is a 67 y.o. female. HPI   History and Physical    Patient's Name/Date of Birth: Yamilet Ortega / 1950    PCP: Bhargavi Shepherd MD.    67 y.o. extremely pleasant emale with PMH pre-menopausal breast cancer in 1993 at the age of 37. IDC, (T1) right breast per her records in 1993, s/p lumpectomy with LND per Dr. Rick Geronimo @ [facility] (reported as negative lymph nodes) Chemotherapy and Radiation therapy followed by endocrine therapy with Tamoxifen, however she only took it for 1 years due to poor tolerance; Took   Evista until September 2021. Discontinued Evista after pelvic fracture from a fall. Pathology report brought by [facility] on this visit:                -History of Leiomyoma 1999 post TASO    Estimated body mass index is 25.4 kg/m² as calculated from the following:    Height as of 12/6/21: 5' 4\" (1.626 m). Weight as of 5/31/22: 148 lb (67.1 kg). Bra Size: 32DD on Left; 32 C    Personal history of breast cancer;     FH:  Mother with breast cancer in her 63's with reoccurrence in her 66's;  Sister with breast cancer in her 63's; Paternal grandmother with Pancreatic cancer. Age of menarche was 15. Age of menopause was January 1994 due to chemotherapy.   Patient denies hormonal therapy. Patient is . Age of first live birth was 32;  Patient did breast feed. Patient drinks little caffeinated beverages. She does not smoke cigarettes. Quit . Occupation: Retired Nurse. Review of Systems   Constitutional: Negative for activity change, appetite change, chills, fatigue, fever and unexpected weight change. She reports she is generally feeling well. She fell at her nephew's house in the spring 2021 and in 2021 was found to have a fracture of the left pelvis. HENT: Negative for congestion. Eyes: Negative for discharge, itching and visual disturbance. Respiratory: Negative for cough and shortness of breath. Cardiovascular: Negative for chest pain and palpitations. Gastrointestinal: Negative for blood in stool, constipation, diarrhea, nausea and vomiting. Diverticulosis on colonoscopy; constipation controlled with AM fiber. She has significant, chronic issues with reflux. Her last EGD was in . She reports symptoms remain well controlled with Pepcid 20 mg twice daily. Musculoskeletal: Positive for arthralgias and back pain. Negative for gait problem, joint swelling, myalgias, neck pain and neck stiffness. Chronic back pain, post fusions. Right foot drop due to back pain. Overall her pain is stable. May 2021 fell and fractured left hip; she reports the fracture has not healed, but not bothersome. Neurological: Negative for weakness, light-headedness and headaches. Psychiatric/Behavioral: Negative for agitation, confusion and decreased concentration. The patient is not nervous/anxious. Objective:   Physical Exam  Vitals and nursing note reviewed. Constitutional:       General: She is not in acute distress. Appearance: Normal appearance. She is well-developed. She is not diaphoretic. Comments: ECOG is stable at 0   HENT:      Head: Normocephalic and atraumatic.       Mouth/Throat: Pharynx: No oropharyngeal exudate. Eyes:      General: No scleral icterus. Right eye: No discharge. Left eye: No discharge. Conjunctiva/sclera: Conjunctivae normal.   Neck:      Thyroid: No thyromegaly. Vascular: No JVD. Trachea: No tracheal deviation. Cardiovascular:      Rate and Rhythm: Normal rate and regular rhythm. Heart sounds: No murmur heard. No friction rub. No gallop. Pulmonary:      Effort: Pulmonary effort is normal. No respiratory distress or retractions. Breath sounds: Normal breath sounds. No stridor. No wheezing or rales. Chest:      Chest wall: No mass, lacerations, deformity, swelling, tenderness or edema. Breasts:      Breasts are asymmetrical (due to post treatment changes. ). Right: No inverted nipple, mass, nipple discharge, skin change or tenderness. Left: Inverted nipple (Chronic and stable.) present. No mass, nipple discharge, skin change or tenderness. Comments: Mild, stable skin thickening on the right c/w post treatment changes. Scars intact. There is no to recurrent disease. Left breast exam is stable and unremarkable. Abdominal:      General: There is no distension. Palpations: Abdomen is soft. Tenderness: There is no abdominal tenderness. There is no guarding or rebound. Musculoskeletal:         General: No tenderness or deformity. Normal range of motion. Right shoulder: Normal.      Left shoulder: Normal.      Cervical back: Normal range of motion and neck supple. Lymphadenopathy:      Cervical: No cervical adenopathy. Right cervical: No superficial, deep or posterior cervical adenopathy. Left cervical: No superficial, deep or posterior cervical adenopathy. Upper Body:      Right upper body: No pectoral adenopathy. Left upper body: No pectoral adenopathy. Skin:     General: Skin is warm and dry. Coloration: Skin is not pale. Findings: No erythema or rash. Neurological:      Mental Status: She is alert and oriented to person, place, and time. Coordination: Coordination normal.   Psychiatric:         Behavior: Behavior normal.         Thought Content: Thought content normal.         Judgment: Judgment normal.        Assessment:      67 y.o. female with history of Premenopausal (age 37) IDC, (T1) right breast per her records in 1993,   s/p lumpectomy with LND per Dr. Winston Roman @ [facility] (reported as negative lymph nodes). Per Dr. Pushpa Quiroz:       Chemotherapy and Radiation therapy followed by endocrine therapy with Tamoxifen, however she only took it for 1 year due to poor tolerance; was on Raloxifene but has since discontinued it. Pathology report from [facility] requested, but no longer available. Breast cancer risk factors include age, gender, personal history of breast cancer; Mother with breast cancer in her 63's with re-occurrence in her 66's;  Sister with breast cancer in her 63's; Paternal grandmother with Pancreatic cancer. She presented 09/24/2018 to establish care.    -A bilateral screening mammogram 09/24/2018:  Negative. -LFT's 08/22/2018 were essentially normal.      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 Seen by Plastics and Reconstructive surgery, Dr. Bernarda Crespo on 10/11/2018 for asymmetry; she opted to forgo surgery at this time. 09/25/2019 bilateral screening mammogram: Focal asymmetry in the left breast.  BI-RADS 0.  09/25/2019 Left breast US: Additional evaluation for the focal asymmetry and the left breast seen on mammogram dated 9/25/2019. No suspicious solid or cystic mass. Negative, BI-RADS  1    She reports that her Sister and her Niece also had genetic testing and all of them were also found to have BRCA-1 VUS. Reviewed screening guidelines as recommended by [employer] (recommending high risk protocol).       -High risk screening per protocol:    -03/06/2020 MRI bilateral breasts:  Benign findings (BI-RADS-2).   -12/01/2020 B/L screening mammogram:  Negative, BI-RADS 1.     -On Raloxifene for bone health per PCP. No clinically suspicious findings.  -04/22/2021 MRI of the bilateral breast: Negative, BI-RADS 1.  -06/08/2021 clinical follow-up she is without evidence of recurrent disease or malignancy. She has had a colonoscopy in the past 1 year (no ployps); EGD in the past 5 years. Hx hysterectomy (1ovary left in due to lack of visualization). -12/06/2021 Bilateral screening mammogram: Negative, BI-RADS 1. Clinical follow up clinical follow-up is without evidence of recurrent disease. We reviewed her genetic testing of BRCA1-VUS. We reviewed her family history, she reports her grandmother had pancreatic cancer at age 80. She follows with Dr. Mitch Gorman for her chronic reflux disease. No new family history to report. Plan to see her back in June 2022 with MRI of the bilateral breast prior. We reviewed her imaging today, including BI-RADS result. We reviewed screening schedule to include MRI of the bilateral breast annually up until age 76. Continue bilateral screening mammograms annually. 5/31/2022 MRI of the bilateral breast post breast conservation therapy changes on the right. No evidence of malignancy. BI-RADS 2  06/23/2022 clinical follow-up is without evidence of malignancy. MRI reviewed, including her BI-RADS result. She expresses discomfort with the MRI of the bilateral breast and we discussed she may be a candidate for bilateral complete breast ultrasounds as an alternate screening tool. We reviewed that MRI annually is the gold standard for high risk screening, and that bilateral whole breast ultrasounds are not as sensitive however, they do offer an supplemental view of the breast.  Will be due for her screening mammogram in December and we will discuss in more detail on that visit. Plan:      1. Continue monthly breast self examination; detailed instructions reviewed today. Bring any changes to your physician's attention. 2. Continue healthy diet and exercise routinely as tolerated. 3. Avoid alcohol or limit alcohol intake to < 3 drinks per week. 4. Limit caffeine intake. 5. Repeat mammogram December 12/07/2022 or later  6. Continue follow up with Primary Care, orthopedics, and all specialties as directed. 7. Return to see in 6 months with bilateral screening mammogram same day. I spent a total of 27 minutes on the date of the service which included preparing to see the patient, face-to-face patient care, completing clinical documentation, obtaining and/or reviewing separately obtained history, performing a medically appropriate examination, counseling and educating the patient/family/caregiver, ordering medications, tests, or procedures, communicating with other HCPs (not separately reported), independently interpreting results (not separately reported), communicating results to the patient/family/caregiver and care coordination (not separately reported). Amando Raymundo, RN, MSN, ACNP-BC, AOCNP  Advanced Oncology Certified Nurse Practitioner  Department of Breast Surgery  [facility]/  [facility] [place] in collaboration with Dr. Marilu Pedraza.  Timmy Santamaria APRN-CNP

## 2022-06-23 ENCOUNTER — OFFICE VISIT (OUTPATIENT)
Dept: BREAST CENTER | Age: 72
End: 2022-06-23
Payer: MEDICARE

## 2022-06-23 VITALS
WEIGHT: 150 LBS | RESPIRATION RATE: 18 BRPM | HEART RATE: 69 BPM | SYSTOLIC BLOOD PRESSURE: 104 MMHG | DIASTOLIC BLOOD PRESSURE: 73 MMHG | TEMPERATURE: 97 F | BODY MASS INDEX: 25.75 KG/M2 | OXYGEN SATURATION: 98 %

## 2022-06-23 DIAGNOSIS — Z91.89 AT HIGH RISK FOR BREAST CANCER: ICD-10-CM

## 2022-06-23 PROBLEM — M21.379 FOOT-DROP: Status: ACTIVE | Noted: 2017-05-30

## 2022-06-23 PROBLEM — D48.5 NEOPLASM OF UNCERTAIN BEHAVIOR OF SKIN: Status: ACTIVE | Noted: 2022-06-23

## 2022-06-23 PROBLEM — M43.16 SPONDYLOLISTHESIS, LUMBAR REGION: Status: ACTIVE | Noted: 2017-06-28

## 2022-06-23 PROBLEM — E78.5 HYPERLIPIDEMIA: Status: ACTIVE | Noted: 2021-01-20

## 2022-06-23 PROBLEM — L57.0 ACTINIC KERATOSIS: Status: ACTIVE | Noted: 2022-06-23

## 2022-06-23 PROBLEM — I10 ESSENTIAL HYPERTENSION: Status: ACTIVE | Noted: 2021-01-20

## 2022-06-23 PROCEDURE — 99213 OFFICE O/P EST LOW 20 MIN: CPT | Performed by: NURSE PRACTITIONER

## 2022-06-23 PROCEDURE — 1036F TOBACCO NON-USER: CPT | Performed by: NURSE PRACTITIONER

## 2022-06-23 PROCEDURE — G8399 PT W/DXA RESULTS DOCUMENT: HCPCS | Performed by: NURSE PRACTITIONER

## 2022-06-23 PROCEDURE — 3017F COLORECTAL CA SCREEN DOC REV: CPT | Performed by: NURSE PRACTITIONER

## 2022-06-23 PROCEDURE — G8417 CALC BMI ABV UP PARAM F/U: HCPCS | Performed by: NURSE PRACTITIONER

## 2022-06-23 PROCEDURE — 1090F PRES/ABSN URINE INCON ASSESS: CPT | Performed by: NURSE PRACTITIONER

## 2022-06-23 PROCEDURE — G8427 DOCREV CUR MEDS BY ELIG CLIN: HCPCS | Performed by: NURSE PRACTITIONER

## 2022-06-23 PROCEDURE — 1123F ACP DISCUSS/DSCN MKR DOCD: CPT | Performed by: NURSE PRACTITIONER

## 2022-06-23 RX ORDER — DENOSUMAB 60 MG/ML
INJECTION SUBCUTANEOUS
COMMUNITY
Start: 2021-09-23 | End: 2022-08-30

## 2022-06-23 ASSESSMENT — ENCOUNTER SYMPTOMS: BACK PAIN: 1

## 2022-08-07 ENCOUNTER — HOSPITAL ENCOUNTER (INPATIENT)
Age: 72
LOS: 1 days | Discharge: HOME HEALTH | DRG: 561 | End: 2022-08-08
Attending: EMERGENCY MEDICINE | Admitting: INTERNAL MEDICINE
Payer: MEDICARE

## 2022-08-07 ENCOUNTER — APPOINTMENT (OUTPATIENT)
Dept: CT IMAGING | Age: 72
DRG: 561 | End: 2022-08-07
Payer: MEDICARE

## 2022-08-07 DIAGNOSIS — S32.592A CLOSED FRACTURE OF RAMUS OF LEFT PUBIS, INITIAL ENCOUNTER (HCC): ICD-10-CM

## 2022-08-07 DIAGNOSIS — S32.810G: Primary | ICD-10-CM

## 2022-08-07 DIAGNOSIS — R26.2 UNABLE TO AMBULATE: ICD-10-CM

## 2022-08-07 PROBLEM — R52 PAIN AGGRAVATED BY WALKING: Status: ACTIVE | Noted: 2022-08-07

## 2022-08-07 PROBLEM — M19.90 OA (OSTEOARTHRITIS): Status: ACTIVE | Noted: 2022-08-07

## 2022-08-07 PROBLEM — G47.00 INSOMNIA: Status: ACTIVE | Noted: 2022-08-07

## 2022-08-07 LAB
ALBUMIN SERPL-MCNC: 3.9 G/DL (ref 3.5–5.2)
ALP BLD-CCNC: 76 U/L (ref 35–104)
ALT SERPL-CCNC: 12 U/L (ref 0–32)
ANION GAP SERPL CALCULATED.3IONS-SCNC: 8 MMOL/L (ref 7–16)
AST SERPL-CCNC: 14 U/L (ref 0–31)
BASOPHILS ABSOLUTE: 0.04 E9/L (ref 0–0.2)
BASOPHILS RELATIVE PERCENT: 0.5 % (ref 0–2)
BILIRUB SERPL-MCNC: 0.2 MG/DL (ref 0–1.2)
BUN BLDV-MCNC: 15 MG/DL (ref 6–23)
CALCIUM SERPL-MCNC: 9 MG/DL (ref 8.6–10.2)
CHLORIDE BLD-SCNC: 103 MMOL/L (ref 98–107)
CO2: 26 MMOL/L (ref 22–29)
CREAT SERPL-MCNC: 0.8 MG/DL (ref 0.5–1)
EOSINOPHILS ABSOLUTE: 0.26 E9/L (ref 0.05–0.5)
EOSINOPHILS RELATIVE PERCENT: 3 % (ref 0–6)
GFR AFRICAN AMERICAN: >60
GFR NON-AFRICAN AMERICAN: >60 ML/MIN/1.73
GLUCOSE BLD-MCNC: 101 MG/DL (ref 74–99)
HCT VFR BLD CALC: 37.4 % (ref 34–48)
HEMOGLOBIN: 12.1 G/DL (ref 11.5–15.5)
IMMATURE GRANULOCYTES #: 0.03 E9/L
IMMATURE GRANULOCYTES %: 0.3 % (ref 0–5)
LYMPHOCYTES ABSOLUTE: 1.59 E9/L (ref 1.5–4)
LYMPHOCYTES RELATIVE PERCENT: 18.3 % (ref 20–42)
MCH RBC QN AUTO: 30.3 PG (ref 26–35)
MCHC RBC AUTO-ENTMCNC: 32.4 % (ref 32–34.5)
MCV RBC AUTO: 93.7 FL (ref 80–99.9)
MONOCYTES ABSOLUTE: 0.93 E9/L (ref 0.1–0.95)
MONOCYTES RELATIVE PERCENT: 10.7 % (ref 2–12)
NEUTROPHILS ABSOLUTE: 5.86 E9/L (ref 1.8–7.3)
NEUTROPHILS RELATIVE PERCENT: 67.2 % (ref 43–80)
PDW BLD-RTO: 13.1 FL (ref 11.5–15)
PLATELET # BLD: 257 E9/L (ref 130–450)
PMV BLD AUTO: 9.1 FL (ref 7–12)
POTASSIUM SERPL-SCNC: 4.1 MMOL/L (ref 3.5–5)
RBC # BLD: 3.99 E12/L (ref 3.5–5.5)
SODIUM BLD-SCNC: 137 MMOL/L (ref 132–146)
TOTAL PROTEIN: 6.4 G/DL (ref 6.4–8.3)
WBC # BLD: 8.7 E9/L (ref 4.5–11.5)

## 2022-08-07 PROCEDURE — 6360000002 HC RX W HCPCS

## 2022-08-07 PROCEDURE — 1200000000 HC SEMI PRIVATE

## 2022-08-07 PROCEDURE — 72192 CT PELVIS W/O DYE: CPT

## 2022-08-07 PROCEDURE — 85025 COMPLETE CBC W/AUTO DIFF WBC: CPT

## 2022-08-07 PROCEDURE — 99285 EMERGENCY DEPT VISIT HI MDM: CPT

## 2022-08-07 PROCEDURE — 80053 COMPREHEN METABOLIC PANEL: CPT

## 2022-08-07 PROCEDURE — 36415 COLL VENOUS BLD VENIPUNCTURE: CPT

## 2022-08-07 PROCEDURE — 6370000000 HC RX 637 (ALT 250 FOR IP): Performed by: INTERNAL MEDICINE

## 2022-08-07 PROCEDURE — 96374 THER/PROPH/DIAG INJ IV PUSH: CPT

## 2022-08-07 RX ORDER — LANOLIN ALCOHOL/MO/W.PET/CERES
400 CREAM (GRAM) TOPICAL DAILY
Status: DISCONTINUED | OUTPATIENT
Start: 2022-08-07 | End: 2022-08-08 | Stop reason: HOSPADM

## 2022-08-07 RX ORDER — TEMAZEPAM 15 MG/1
15 CAPSULE ORAL NIGHTLY PRN
Status: DISCONTINUED | OUTPATIENT
Start: 2022-08-07 | End: 2022-08-08 | Stop reason: HOSPADM

## 2022-08-07 RX ORDER — FAMOTIDINE 20 MG/1
20 TABLET, FILM COATED ORAL 2 TIMES DAILY
Status: DISCONTINUED | OUTPATIENT
Start: 2022-08-07 | End: 2022-08-08 | Stop reason: HOSPADM

## 2022-08-07 RX ORDER — HYDROCODONE BITARTRATE AND ACETAMINOPHEN 7.5; 325 MG/1; MG/1
1 TABLET ORAL EVERY 6 HOURS PRN
Status: DISCONTINUED | OUTPATIENT
Start: 2022-08-07 | End: 2022-08-08 | Stop reason: HOSPADM

## 2022-08-07 RX ORDER — CELECOXIB 100 MG/1
100 CAPSULE ORAL 2 TIMES DAILY
Status: DISCONTINUED | OUTPATIENT
Start: 2022-08-07 | End: 2022-08-08 | Stop reason: HOSPADM

## 2022-08-07 RX ORDER — FENTANYL CITRATE 0.05 MG/ML
50 INJECTION, SOLUTION INTRAMUSCULAR; INTRAVENOUS ONCE
Status: COMPLETED | OUTPATIENT
Start: 2022-08-07 | End: 2022-08-07

## 2022-08-07 RX ORDER — ATORVASTATIN CALCIUM 10 MG/1
10 TABLET, FILM COATED ORAL DAILY
Status: DISCONTINUED | OUTPATIENT
Start: 2022-08-07 | End: 2022-08-08 | Stop reason: HOSPADM

## 2022-08-07 RX ORDER — ANTIARTHRITIC COMBINATION NO.2 900 MG
1 TABLET ORAL DAILY
Status: DISCONTINUED | OUTPATIENT
Start: 2022-08-07 | End: 2022-08-07 | Stop reason: CLARIF

## 2022-08-07 RX ORDER — WHEAT DEXTRIN 3 G/3.8 G
4 POWDER (GRAM) ORAL
Status: DISCONTINUED | OUTPATIENT
Start: 2022-08-07 | End: 2022-08-07 | Stop reason: CLARIF

## 2022-08-07 RX ORDER — METOPROLOL SUCCINATE 25 MG/1
25 TABLET, EXTENDED RELEASE ORAL DAILY
Status: DISCONTINUED | OUTPATIENT
Start: 2022-08-07 | End: 2022-08-08 | Stop reason: HOSPADM

## 2022-08-07 RX ORDER — ASPIRIN 81 MG/1
81 TABLET ORAL DAILY
Status: DISCONTINUED | OUTPATIENT
Start: 2022-08-07 | End: 2022-08-08 | Stop reason: HOSPADM

## 2022-08-07 RX ADMIN — CALCIUM CARBONATE-VITAMIN D TAB 500 MG-200 UNIT 1 TABLET: 500-200 TAB at 10:25

## 2022-08-07 RX ADMIN — CELECOXIB 100 MG: 100 CAPSULE ORAL at 10:26

## 2022-08-07 RX ADMIN — FENTANYL CITRATE 50 MCG: 50 INJECTION INTRAMUSCULAR; INTRAVENOUS at 03:31

## 2022-08-07 RX ADMIN — HYDROCODONE BITARTRATE AND ACETAMINOPHEN 1 TABLET: 7.5; 325 TABLET ORAL at 20:02

## 2022-08-07 RX ADMIN — PSYLLIUM HUSK 1 PACKET: 3.4 GRANULE ORAL at 10:26

## 2022-08-07 RX ADMIN — FAMOTIDINE 20 MG: 20 TABLET ORAL at 10:26

## 2022-08-07 RX ADMIN — ATORVASTATIN CALCIUM 10 MG: 10 TABLET, FILM COATED ORAL at 10:25

## 2022-08-07 RX ADMIN — Medication 400 MG: at 10:25

## 2022-08-07 RX ADMIN — ASPIRIN 81 MG: 81 TABLET, COATED ORAL at 10:26

## 2022-08-07 RX ADMIN — HYDROCODONE BITARTRATE AND ACETAMINOPHEN 1 TABLET: 7.5; 325 TABLET ORAL at 10:25

## 2022-08-07 RX ADMIN — CALCIUM CARBONATE-VITAMIN D TAB 500 MG-200 UNIT 1 TABLET: 500-200 TAB at 20:02

## 2022-08-07 RX ADMIN — FAMOTIDINE 20 MG: 20 TABLET ORAL at 20:03

## 2022-08-07 RX ADMIN — METOPROLOL SUCCINATE 25 MG: 25 TABLET, EXTENDED RELEASE ORAL at 10:26

## 2022-08-07 RX ADMIN — CELECOXIB 100 MG: 100 CAPSULE ORAL at 20:03

## 2022-08-07 ASSESSMENT — ENCOUNTER SYMPTOMS
SINUS PAIN: 0
SHORTNESS OF BREATH: 0
NAUSEA: 0
COUGH: 0
CONSTIPATION: 0
BACK PAIN: 0
ABDOMINAL PAIN: 0
EYE PAIN: 0
DIARRHEA: 0
VOMITING: 0

## 2022-08-07 ASSESSMENT — PAIN - FUNCTIONAL ASSESSMENT: PAIN_FUNCTIONAL_ASSESSMENT: PREVENTS OR INTERFERES WITH MANY ACTIVE NOT PASSIVE ACTIVITIES

## 2022-08-07 ASSESSMENT — PAIN SCALES - WONG BAKER: WONGBAKER_NUMERICALRESPONSE: 0

## 2022-08-07 ASSESSMENT — PAIN SCALES - GENERAL
PAINLEVEL_OUTOF10: 7
PAINLEVEL_OUTOF10: 6
PAINLEVEL_OUTOF10: 5

## 2022-08-07 ASSESSMENT — PAIN DESCRIPTION - DESCRIPTORS
DESCRIPTORS: ACHING
DESCRIPTORS: ACHING;THROBBING;CRAMPING

## 2022-08-07 ASSESSMENT — PAIN DESCRIPTION - ORIENTATION
ORIENTATION: LEFT
ORIENTATION: LEFT

## 2022-08-07 ASSESSMENT — PAIN DESCRIPTION - LOCATION
LOCATION: HIP
LOCATION: HIP

## 2022-08-07 ASSESSMENT — LIFESTYLE VARIABLES: HOW OFTEN DO YOU HAVE A DRINK CONTAINING ALCOHOL: NEVER

## 2022-08-07 NOTE — ED NOTES
Pt unable to bear weight on left leg- ED resident present at bedside and aware.       Roz Johnston LPN  82/18/25 [id number]

## 2022-08-07 NOTE — PROGRESS NOTES
Pharmacy Note    Ailyn Valencia was ordered biotin. As per the [facility], herbals and certain dietary supplements will be discontinued.   The herbal or dietary supplement may be continued after discharge from the hospital.    Patience Dues, PharmD, 0/0/72552:80 AM

## 2022-08-07 NOTE — PROGRESS NOTES
Room #: 6941/1606-17  Date: 2022       Patient Name: Luis Celis  : 1950      MRN: [id number]     Patient unavailable for physical therapy eval due to awaiting ortho consult. Will attempt PT evaluation at a later time. Thank you.        Herlinda Martinez, PT

## 2022-08-07 NOTE — ED PROVIDER NOTES
[facility]  Department of Emergency Medicine     Written by: Pranay Woodall DO  Patient Name: Nilda Small  Attending Provider: No att. providers found  Admit Date: 2022 12:20 AM  MRN: [id number]                   : 1950        Chief Complaint   Patient presents with    Hip Pain     Left hip    - Chief complaint    Patient is a 35-year-old female here with a chief complaint of left-sided pelvic pain. PMH includes multiple pelvic fractures on the left side. Patient states that the pain has been a chronic issue ever since she fractured her pelvis a year ago. She has been through PT and at times pain has been minimal but over the past month it has been getting progressively worse in these past 2 days have been excruciating. Around 9:00 PM patient got up to go to the bathroom and was unable to bear weight on her left leg. Patient took a tramadol with minimal relief. Patient states the pain ranges between a 7-10/10 and is worsened with movement. Patient describes the sensation as if something exploded in her left pelvis. Review of Systems   Constitutional:  Negative for chills and fever. HENT:  Negative for ear pain and sinus pain. Eyes:  Negative for pain. Respiratory:  Negative for cough and shortness of breath. Cardiovascular:  Negative for chest pain. Gastrointestinal:  Negative for abdominal pain, constipation, diarrhea, nausea and vomiting. Genitourinary:  Positive for pelvic pain. Negative for difficulty urinating, dysuria and flank pain. Musculoskeletal:  Negative for back pain. Skin:  Negative for rash. Neurological:  Negative for dizziness, weakness, light-headedness and headaches. Psychiatric/Behavioral:  Negative for confusion. Physical Exam  Constitutional:       General: She is not in acute distress. Appearance: Normal appearance. She is not ill-appearing. HENT:      Head: Normocephalic.       Right Ear: External ear normal. Left Ear: External ear normal.      Nose: Nose normal. No congestion or rhinorrhea. Mouth/Throat:      Mouth: Mucous membranes are moist.   Eyes:      Conjunctiva/sclera: Conjunctivae normal.      Pupils: Pupils are equal, round, and reactive to light. Cardiovascular:      Rate and Rhythm: Normal rate and regular rhythm. Pulses: Normal pulses. Pulmonary:      Effort: Pulmonary effort is normal. No respiratory distress. Breath sounds: Normal breath sounds. No stridor. No wheezing or rales. Abdominal:      General: Abdomen is flat. Bowel sounds are normal. There is no distension. Palpations: Abdomen is soft. Tenderness: There is no abdominal tenderness. There is no guarding. Musculoskeletal:         General: Tenderness present. Normal range of motion. Cervical back: Normal range of motion and neck supple. Comments: Mild tenderness to palpation of the left inguinal region   Skin:     General: Skin is warm. Findings: No erythema, lesion or rash. Neurological:      General: No focal deficit present. Mental Status: She is oriented to person, place, and time. Sensory: No sensory deficit. Motor: No weakness. Psychiatric:         Behavior: Behavior normal.        Procedures       MDM  Number of Diagnoses or Management Options  Closed fracture of ramus of left pubis, initial encounter ([facility] [address]  Multiple closed pelvic fractures with disruption of pelvic ring, with delayed healing, subsequent encounter  Unable to ambulate  Diagnosis management comments: Patient is a 70-year-old female here with left pelvic pain and a history of pelvic fractures that have happened difficult to heal.  Pelvic fractures occurred about 1 year ago and she has been through PT but chronic pain has persisted and been increasingly worse with last night being unbearable.   Patient had an x-ray in March showing that the fractures have still not healed completely despite 7 months of time since fracture. DDx suspicious for pelvic fracture, inguinal hernia, femoral hernia, ovarian torsion. Pt given appropriate analgesics for severe pain. CT pelvis revealed ununited old fractures of the superior and inferior left pubic rami. Pt was unable to ambulate or even stand up upon re examination. Although pt retired and under the care of her  at home, neither felt she was stable enough to return home, even with his help she cannot fulfil daily tasks. I consulted Dr. Desiree Gillis about admission and he requested that despite her inability to return home that I consult ortho first. Ortho consulted and agreed that pt should be admitted for treatment until Ortho can see her. Pt agreed with plan to be admitted.                --------------------------------------------- PAST HISTORY ---------------------------------------------  Past Medical History:  has a past medical history of Arthritis, Breast cancer ([facility] [address] Hallux rigidus of right foot, Neuroma of foot, and PONV (postoperative nausea and vomiting). Past Surgical History:  has a past surgical history that includes laminectomy (09/01/2015); lumbar fusion; Tonsillectomy; Appendectomy; Breast surgery (Right); Toe Surgery (Right); Ovarian cyst surgery; Hysterectomy; Toe Fusion (Right, 02/21/2017); pre-malignant / benign skin lesion excision; joint replacement (Left); Colonoscopy (2016); and Pelvic fracture surgery. Social History:  reports that she quit smoking about 45 years ago. Her smoking use included cigarettes. She has a 9.00 pack-year smoking history. She has never used smokeless tobacco. She reports that she does not drink alcohol and does not use drugs. Family History: family history includes Breast Cancer (age of onset: 58) in her mother and sister; Cancer (age of onset: 80) in her paternal grandmother. The patients home medications have been reviewed.     Allergies: Patient has no known allergies. -------------------------------------------------- RESULTS -------------------------------------------------    LABS:  Results for orders placed or performed during the hospital encounter of 08/07/22   CBC with Auto Differential   Result Value Ref Range    WBC 8.7 4.5 - 11.5 E9/L    RBC 3.99 3.50 - 5.50 E12/L    Hemoglobin 12.1 11.5 - 15.5 g/dL    Hematocrit 37.4 34.0 - 48.0 %    MCV 93.7 80.0 - 99.9 fL    MCH 30.3 26.0 - 35.0 pg    MCHC 32.4 32.0 - 34.5 %    RDW 13.1 11.5 - 15.0 fL    Platelets 547 686 - 487 E9/L    MPV 9.1 7.0 - 12.0 fL    Neutrophils % 67.2 43.0 - 80.0 %    Immature Granulocytes % 0.3 0.0 - 5.0 %    Lymphocytes % 18.3 (L) 20.0 - 42.0 %    Monocytes % 10.7 2.0 - 12.0 %    Eosinophils % 3.0 0.0 - 6.0 %    Basophils % 0.5 0.0 - 2.0 %    Neutrophils Absolute 5.86 1.80 - 7.30 E9/L    Immature Granulocytes # 0.03 E9/L    Lymphocytes Absolute 1.59 1.50 - 4.00 E9/L    Monocytes Absolute 0.93 0.10 - 0.95 E9/L    Eosinophils Absolute 0.26 0.05 - 0.50 E9/L    Basophils Absolute 0.04 0.00 - 0.20 E9/L   Comprehensive Metabolic Panel   Result Value Ref Range    Sodium 137 132 - 146 mmol/L    Potassium 4.1 3.5 - 5.0 mmol/L    Chloride 103 98 - 107 mmol/L    CO2 26 22 - 29 mmol/L    Anion Gap 8 7 - 16 mmol/L    Glucose 101 (H) 74 - 99 mg/dL    BUN 15 6 - 23 mg/dL    Creatinine 0.8 0.5 - 1.0 mg/dL    GFR Non-African American >60 >=60 mL/min/1.73    GFR African American >60     Calcium 9.0 8.6 - 10.2 mg/dL    Total Protein 6.4 6.4 - 8.3 g/dL    Albumin 3.9 3.5 - 5.2 g/dL    Total Bilirubin 0.2 0.0 - 1.2 mg/dL    Alkaline Phosphatase 76 35 - 104 U/L    ALT 12 0 - 32 U/L    AST 14 0 - 31 U/L       RADIOLOGY:  CT PELVIS WO CONTRAST Additional Contrast? None   Final Result   Diverticulosis of the sigmoid colon. There are old ununited superior and inferior  left pubic rami fractures.                    ------------------------- NURSING NOTES AND VITALS REVIEWED ---------------------------  Date / Time Roomed:  8/7/2022 12:20 AM  ED Bed Assignment:  0340/0340-01    The nursing notes within the ED encounter and vital signs as below have been reviewed. Patient Vitals for the past 24 hrs:   BP Temp Temp src Pulse Resp SpO2 Height   08/08/22 1045 -- -- -- -- -- -- 5' 4\" (1.626 m)   08/08/22 0825 -- -- -- -- 18 -- --   08/08/22 0712 117/78 97.8 °F (36.6 °C) Oral 86 18 93 % --       Oxygen Saturation Interpretation: Normal    ------------------------------------------ PROGRESS NOTES ------------------------------------------  Re-evaluation(s):  Time: 0530  Patients symptoms show no change  Repeat physical examination is not changed    Counseling:  I have spoken with the patient and discussed todays results, in addition to providing specific details for the plan of care and counseling regarding the diagnosis and prognosis. Their questions are answered at this time and they are agreeable with the plan of admission.    --------------------------------- ADDITIONAL PROVIDER NOTES ---------------------------------  Consultations:  Time: 0600. Spoke with Dr. Sandi Hackett. Discussed case. They will admit the patient. This patient's ED course included: a personal history and physicial examination, re-evaluation prior to disposition, multiple bedside re-evaluations, and complex medical decision making and emergency management    This patient has remained hemodynamically stable during their ED course. Diagnosis:  1. Multiple closed pelvic fractures with disruption of pelvic ring, with delayed healing, subsequent encounter    2. Closed fracture of ramus of left pubis, [facility]    3. Unable to ambulate        Disposition:  Patient's disposition: Admit to med/surg floor  Patient's condition is good. Patient was seen and evaluated by myself and my attending No att. providers found. Assessment and Plan discussed with attending provider, please see attestation for final plan of care.      Kobi SINGER, DO       Kylee Carrera DO  Resident  08/07/22 [id number]      ATTENDING PROVIDER ATTESTATION:     I have personally performed and/or participated in the history, exam, medical decision making, and procedures and agree with all pertinent clinical information. I have also reviewed and agree with the past medical, family and social history unless otherwise noted. I have discussed this patient in detail with the resident, and provided the instruction and education regarding pelvic pain and fracture. My findings/Plan: Heart RRR. Lungs CTA bilaterally. Abdomen soft, nontender. Bowel sounds normal. Tenderness of left pelvis. Supportive care. Consult orthopedic surgery. Admit for further evaluation and treatment.        [address] [facility]  08/08/22 2013

## 2022-08-07 NOTE — CONSULTS
Department of Orthopedic Surgery  Resident Consult Note          Reason for Consult: Chronic Left Hip Pain    HISTORY OF PRESENT ILLNESS:       Patient is a 67 y.o. female who presents with hip pain after extensive exercise approximately 2 months ago. Patient has a chronic history of left hip pain secondary to a fracture ot the left superior pubic rami. She is currently established with Dr. Juan Pablo Sanders he sough non-operative treatment for her injury. Patient indicates that she has had a recent visit with Dr. Juan Pablo Sanders, and the results of office visit was concern of nonunion of this fracture. Denies NTP, however there is associated intermittent numbness on the lateral aspect of the fight foot which is her baseline. Pain described as achy, and average a 6 when resting prior to recent admission. Currently it is <4. Patient has a medical history of breath lump with she had removed several years ago and received chemotherapy. Other significant PMH DM, neuropathy and slap foot on the left side. Patient indicated that she would wish to continue with Dr. Juan Pablo Sanders for treatment of her current symptoms. Patient indicates that since pain has progressed, she has utilized walker for ambulatory assistance. ASA for DVT prophylaxis. Denies tobacco hx  Drinks wine occasionally  NO illicit drug usage.      Past Medical History:        Diagnosis Date    Arthritis     Breast cancer ([facility] [address]     infiltrating ductal  right breast    Hallux rigidus of right foot 02/21/2017    Neuroma of foot     great toe    PONV (postoperative nausea and vomiting)      Past Surgical History:        Procedure Laterality Date    APPENDECTOMY      BREAST SURGERY Right     lumpectomy    COLONOSCOPY  2016    HYSTERECTOMY (CERVIX STATUS UNKNOWN)      JOINT REPLACEMENT Left     knee, left    LAMINECTOMY  09/01/2015    x2    LUMBAR FUSION      L4-L5 X2    OVARIAN CYST SURGERY      PELVIC FRACTURE SURGERY      PRE-MALIGNANT / BENIGN SKIN LESION EXCISION      squamous cell TOE FUSION Right 02/21/2017    Right 1st Metatarsophalangeal Joint fusion right great toe neuroma resection    TOE SURGERY Right     great toe    TONSILLECTOMY       Current Medications:   Current Facility-Administered Medications: aspirin EC tablet 81 mg, 81 mg, Oral, Daily  atorvastatin (LIPITOR) tablet 10 mg, 10 mg, Oral, Daily  magnesium oxide (MAG-OX) tablet 400 mg, 400 mg, Oral, Daily  metoprolol succinate (TOPROL XL) extended release tablet 25 mg, 25 mg, Oral, Daily  temazepam (RESTORIL) capsule 15 mg, 15 mg, Oral, Nightly PRN  celecoxib (CELEBREX) capsule 100 mg, 100 mg, Oral, BID  famotidine (PEPCID) tablet 20 mg, 20 mg, Oral, BID  HYDROcodone-acetaminophen (NORCO) 7.5-325 MG per tablet 1 tablet, 1 tablet, Oral, Q6H PRN  oyster shell calcium w/D 500-200 MG-UNIT tablet 1 tablet, 1 tablet, Oral, BID  psyllium (KONSYL) 28.3 % packet 1 packet, 1 packet, Oral, Daily with breakfast  Facility-Administered Medications Ordered in Other Encounters: gadobutrol (GADAVIST) injection 6 mL, 6 mL, IntraVENous, ONCE PRN  Allergies:  Patient has no known allergies. Social History:   TOBACCO:   reports that she quit smoking about 45 years ago. Her smoking use included cigarettes. She has a 9.00 pack-year smoking history. She has never used smokeless tobacco.  ETOH:   reports no history of alcohol use. DRUGS:   reports no history of drug use.   ACTIVITIES OF DAILY LIVING:    OCCUPATION:    Family History:       Problem Relation Age of Onset    Breast Cancer Mother 58    Breast Cancer Sister 58    Cancer Paternal Grandmother 80        pancreatic       REVIEW OF SYSTEMS:  CONSTITUTIONAL:  negative for  fevers, chills  EYES:  negative for blurred vision, visual disturbance  HEENT:  negative for  hearing loss, voice change  RESPIRATORY:  negative for  dyspnea, wheezing  CARDIOVASCULAR:  negative for  chest pain, palpitations  GASTROINTESTINAL:  negative for nausea, vomiting  GENITOURINARY:  negative for frequency, urinary incontinence  HEMATOLOGIC/LYMPHATIC:  negative for bleeding and petechiae  MUSCULOSKELETAL:  positive for  decreased range of motion and bone pain  NEUROLOGICAL:  negative for headaches, dizziness  BEHAVIOR/PSYCH:  negative for increased agitation and anxiety    PHYSICAL EXAM:    VITALS:  /74   Pulse 73   Temp 98.4 °F (36.9 °C) (Oral)   Resp 18   Wt 146 lb (66.2 kg)   SpO2 97%   BMI 25.06 kg/m²   CONSTITUTIONAL:  awake, alert, cooperative, no apparent distress, and appears stated age  General appearance: alert, well appearing, and in no distress,  normal appearing weight  Mental status: alert, oriented to person, place, and time, normal mood, behavior, speech, dress, motor activity, and thought processes  Abdomen: soft, nondistended   Resp:   resp easy and unlabored, no audible wheezes note  Cardiac: distal pulses palpable, skin well perfused  Neurological: alert, oriented X3, normal speech, no focal findings or movement disorder noted, motor and sensory grossly normal bilaterally, normal muscle tone, no tremors, strength 5/5, normal gait and station  HEENT: normochephalic atraumatic, external ears and eyes normal, sclera normal, neck supple  Extremities:   peripheral pulses normal, no edema, redness or tenderness in the calves   Skin: normal coloration, no rashes or open wounds, no suspicious skin lesions noted  Psych: Affect euthymic   MUSCULOSKELETAL:  Left lower Extremity:  -ve leg length discrepancy, lower extremities does not appear to be externally rotated  Skin intact with no signs of degloving, perineal hematoma swelling or ecchymosis. No lacerations or abrasions visualized. No flank hematoma noted. +TTP at the pubic region, -TTP at the ASIS or pelvic brim.  -TTP at the midshaft and distal femur, knee, tibia, ankle and foot.   + Log roll  -Heel strike  no Instability with external rotational stress  Compartments soft and compressible, calf non-tender  +DP & PT pulses, Brisk Cap refill, Toes warm and perfused  Sensation grossly intact superficial/deep peroneal,saphenous,sural,tibial n. Distributions. Numbness at the lateral aspect of the foot. +GS/TA/EHL. Secondary Exam:     bilateralUE: No obvious signs of trauma. -TTP to fingers, hand, wrist, forearm, elbow, humerus, shoulder or clavicle. rightLE: No obvious signs of trauma. -TTP to foot, ankle, leg, knee, thigh, hip. Sensation grossly intact superficial/deep peroneal,saphenous,sural,tibial n. Distributions. +GS/TA/EHL. +DP & PT pulses, Brisk Cap refill, Toes warm and perfused    Pelvis: -TTP, -Log roll, -Heel strike     DATA:    CBC:   Lab Results   Component Value Date/Time    WBC 8.7 08/07/2022 06:17 AM    RBC 3.99 08/07/2022 06:17 AM    HGB 12.1 08/07/2022 06:17 AM    HCT 37.4 08/07/2022 06:17 AM    MCV 93.7 08/07/2022 06:17 AM    MCH 30.3 08/07/2022 06:17 AM    MCHC 32.4 08/07/2022 06:17 AM    RDW 13.1 08/07/2022 06:17 AM     08/07/2022 06:17 AM    MPV 9.1 08/07/2022 06:17 AM     PT/INR:    Lab Results   Component Value Date/Time    PROTIME 10.2 07/28/2017 02:20 PM    INR 0.9 07/28/2017 02:20 PM     Lactic Acid : No results found for: [address]    Radiology Review:  08/07/22 - Ct pelvis axial view demonstrating a previous chronic fracture at the root of the left superior pubic rami. There is callus formation at the inferior pubic rami. This too appears to be nonunionized. No other fractures or dislocations visualized. IMPRESSION:   Closed, Right Superior Pubic Rami Fracture NonUnion    PLAN:  WBAT - LLE  Pain control per primary  PT/OT - Short Gait training only initially: 6-10 feet  No acute orthopedic intervention at this time  We will follow with serial X-rays, and monitor for occult injuries. Patient will follow up with Dr. Cris Rosado in office next week for repeat Xrs and further examination to determine cause of nonunion.  If symptoms continue to persist, may refer patient to orthopedic trauma specialist Dr. Lia Clancy; however this will be determine during our office visit. Patient may be ok to discharge when medically stable and have made the appropriate physical therapy gains.    Review and discuss with Dr. Elen Vincent

## 2022-08-07 NOTE — ED NOTES
Pt report to RN that will be taking over pt care on the 3rd floor.       Sheldon Vigil LPN  90/55/21 [id number]

## 2022-08-07 NOTE — ED NOTES
Pt resting on ED stretcher with no s/s of distress noted.  remains at bedside.       Paras Veronica LPN  02/90/63 0863

## 2022-08-07 NOTE — ED NOTES
Pt resting on ED stretcher with no concerns/needs voiced-  at bedside.       Ismael Palomino LPN  34/31/82 [id number]

## 2022-08-08 VITALS
BODY MASS INDEX: 24.92 KG/M2 | SYSTOLIC BLOOD PRESSURE: 117 MMHG | OXYGEN SATURATION: 93 % | WEIGHT: 146 LBS | RESPIRATION RATE: 18 BRPM | HEART RATE: 86 BPM | TEMPERATURE: 97.8 F | HEIGHT: 64 IN | DIASTOLIC BLOOD PRESSURE: 78 MMHG

## 2022-08-08 PROCEDURE — 97530 THERAPEUTIC ACTIVITIES: CPT

## 2022-08-08 PROCEDURE — 6370000000 HC RX 637 (ALT 250 FOR IP): Performed by: INTERNAL MEDICINE

## 2022-08-08 PROCEDURE — 97116 GAIT TRAINING THERAPY: CPT | Performed by: PHYSICAL THERAPIST

## 2022-08-08 PROCEDURE — 97165 OT EVAL LOW COMPLEX 30 MIN: CPT

## 2022-08-08 PROCEDURE — 97161 PT EVAL LOW COMPLEX 20 MIN: CPT | Performed by: PHYSICAL THERAPIST

## 2022-08-08 RX ORDER — HYDROCODONE BITARTRATE AND ACETAMINOPHEN 7.5; 325 MG/1; MG/1
1 TABLET ORAL EVERY 6 HOURS PRN
Qty: 60 TABLET | Refills: 0 | Status: SHIPPED | OUTPATIENT
Start: 2022-08-08 | End: 2022-08-22

## 2022-08-08 RX ADMIN — HYDROCODONE BITARTRATE AND ACETAMINOPHEN 1 TABLET: 7.5; 325 TABLET ORAL at 08:25

## 2022-08-08 RX ADMIN — ATORVASTATIN CALCIUM 10 MG: 10 TABLET, FILM COATED ORAL at 08:25

## 2022-08-08 RX ADMIN — FAMOTIDINE 20 MG: 20 TABLET ORAL at 08:25

## 2022-08-08 RX ADMIN — ASPIRIN 81 MG: 81 TABLET, COATED ORAL at 08:25

## 2022-08-08 RX ADMIN — CALCIUM CARBONATE-VITAMIN D TAB 500 MG-200 UNIT 1 TABLET: 500-200 TAB at 08:25

## 2022-08-08 RX ADMIN — METOPROLOL SUCCINATE 25 MG: 25 TABLET, EXTENDED RELEASE ORAL at 08:25

## 2022-08-08 RX ADMIN — PSYLLIUM HUSK 1 PACKET: 3.4 GRANULE ORAL at 08:24

## 2022-08-08 RX ADMIN — CELECOXIB 100 MG: 100 CAPSULE ORAL at 08:25

## 2022-08-08 RX ADMIN — Medication 400 MG: at 08:25

## 2022-08-08 ASSESSMENT — PAIN - FUNCTIONAL ASSESSMENT: PAIN_FUNCTIONAL_ASSESSMENT: ACTIVITIES ARE NOT PREVENTED

## 2022-08-08 ASSESSMENT — PAIN DESCRIPTION - ORIENTATION: ORIENTATION: LEFT

## 2022-08-08 ASSESSMENT — PAIN SCALES - GENERAL
PAINLEVEL_OUTOF10: 5
PAINLEVEL_OUTOF10: 7

## 2022-08-08 ASSESSMENT — PAIN DESCRIPTION - DESCRIPTORS: DESCRIPTORS: BURNING;SHARP;ACHING

## 2022-08-08 ASSESSMENT — PAIN DESCRIPTION - LOCATION: LOCATION: PELVIS

## 2022-08-08 NOTE — PROGRESS NOTES
strength for ADLs/functional transfers  * Therapeutic activities to facilitate/challenge dynamic balance, stand tolerance for increased safety and independence with ADLs  * Positioning to improve skin integrity, interaction with environment and functional independence    Recommended Adaptive Equipment: ww, BSC- TBD     Home Living:  Pt lives with  in a 2 story with 2 step(s) to enter and 0 rail(s); bed/bath on 2nd   Bathroom setup: walk in shower 2nd floor with built in shower bench, GB, 1/2 bath on 1st   Equipment owned: ww, cane,     Prior Level of Function: Independent  with ADLs , Independent  with IADLs; ambulated with no AD prior to 1 1/2 weeks ago when pain had increased in pelvis  Driving: yes  Occupation/leisure: pt reported she stays active with exercise    Pain Level: 5/10 in pelvis    Cognition: A&O: 4/4;   Follows multi step directions: good    Memory:  good    Sequencing:  good    Problem solving:  good    Judgement/safety:  good     Functional Assessment:   AM-PAC Daily Activity Raw Score: 18/24     Initial Eval Status  Date: 8/8/22 Treatment Status  Date: STG=LTG  Time frame: 10-14 days   Feeding Independent   Independent    Grooming SBA  At bed level  Modified Sandy    UB Dressing Stand by Assist   Modified Sandy    LB Dressing Minimal Assist   Modified Sandy    Bathing Moderate Assist  Modified Sandy    Toileting Minimal Assist   Modified Sandy    Bed Mobility  Supine to sit: Minimal Assist   Sit to supine: Minimal Assist   Pt education and practiced use of leg /modified tech  Supine to sit: Modified Sandy   Sit to supine: Modified Sandy    Functional Transfers Sit to stand: Minimal Assist from EOB,   Stand to sit: Stand by Assist   Stand pivot: NT   Modified Sandy    Functional Mobility Minimal Assist  with ww  Slow gait, short distance n  Modified Sandy  with ww   Balance Sitting: Independent      Standing: Minimal Assist Sitting: Independent       Standing: Modified [place]    Activity Tolerance Fair,  good   Visual/  Perceptual Glasses: yes  Perception: NT          BUE  ROM/Strength/  Fine motor Coordination Hand dominance: R    RUE: ROM WFL     Strength: grossly 4/5      Strength:  WFL     Coordination:   WFL    LUE: ROM  WFL     Strength: grossly 4/5      Strength:  WFL     Coordination: WFL       Hearing: WFL   Sensation:   c/o numbness or tingling RLE, chronic  Tone:  WFL   Edema:  None noted    Comments:   RN cleared patient for OT. Upon arrival, patient in bed and agreeable to OT. Steph Martinez Therapist facilitated and instructed pt on adapted  techniques & compensatory strategies to improve safety and independence with basic ADLs, bed mobility,  functional transfers & functional mobility  to allow pt to achieve highest level of independence and safely. Patient presents with pain in pelvic area,  decreased  ADLs,  bed mobility,  functional transfers,  functional mobility . At end of session, patient in bed with call light and phone within reach, all lines and tubes intact. Pt would benefit from continued skilled OT to increase safety and independence with completion of ADL tasks and functional mobility for improved quality of life. Treatment: OT treatment provided this date includes: ADL-  Instruction/training on safety and adapted techniques for completion of LE ADL   Mobility-  Instruction/training on safety and improved independence with bed mobility with use of leg  and modified technique, functional transfers and functional mobility with ww. Therapist facilitated and provided cues for body alignment and hand/feet placement. Rehab Potential: Good  for established goals     Patient / Family Goal: go home    Patient and/or family were instructed on functional diagnosis, prognosis/goals and OT plan of care. Demonstrated good understanding.      Eval Complexity: Low    Time In: 9:15  Time Out: 9:38  Total Treatment Time: 10 minutes    Min Units   OT Eval Low 92316  x     OT Eval Medium 15329      OT Eval High 69713       OT Re-Eval [id number]       Therapeutic Ex 94031       Therapeutic Activities 60859  10 1   ADL/Self Care 75648      Orthotic Management 52459       Neuro Re-Ed 12197       Non-Billable Time          Evaluation Time includes thorough review of current medical information, gathering information on past medical history/social history and prior level of function, completion of standardized testing/informal observation of tasks, assessment of data and education on plan of care and goals.             [place], [place] #[id number]

## 2022-08-08 NOTE — H&P
Department of Internal Medicine            CHIEF COMPLAINT:  left hip pain    HISTORY OF PRESENT ILLNESS:      This is a femal who have left hip fracture almost a year ago. Hip fracture was non displaced. She has been seeing dr Roland Ahn for this. Pt has been doing well with therapy and ha been feeling great. This week she started to have some pain on left hip to groin . Last night the pain was so bad that she could not able to walk. Pt was unable to put pressure on left leg. She did not fall since last fracture. PAST MEDICAL Hx:  Past Medical History:   Diagnosis Date    Arthritis     Breast cancer ([address]     infiltrating ductal  right breast    Hallux rigidus of right foot 02/21/2017    Neuroma of foot     great toe    PONV (postoperative nausea and vomiting)        PAST SURGICAL Hx:   Past Surgical History:   Procedure Laterality Date    APPENDECTOMY      BREAST SURGERY Right     lumpectomy    COLONOSCOPY  2016    HYSTERECTOMY (CERVIX STATUS UNKNOWN)      JOINT REPLACEMENT Left     knee, left    LAMINECTOMY  09/01/2015    x2    LUMBAR FUSION      L4-L5 X2    OVARIAN CYST SURGERY      PELVIC FRACTURE SURGERY      PRE-MALIGNANT / BENIGN SKIN LESION EXCISION      squamous cell     TOE FUSION Right 02/21/2017    Right 1st Metatarsophalangeal Joint fusion right great toe neuroma resection    TOE SURGERY Right     great toe    TONSILLECTOMY         FAMILY Hx:  Family History   Problem Relation Age of Onset    Breast Cancer Mother 58    Breast Cancer Sister 58    Cancer Paternal Grandmother 80        pancreatic       HOME MEDICATIONS:  Prior to Admission medications    Medication Sig Start Date End Date Taking?  Authorizing Provider   denosumab (PROLIA) 60 MG/ML SOSY SC injection  9/23/21   Historical Provider, MD   diclofenac sodium (VOLTAREN) 1 % GEL Voltaren 1 % topical gel    Historical Provider, MD   Calcium Carb-Cholecalciferol 600-500 MG-UNIT CAPS Take by mouth 2 times daily    Historical Provider, MD temazepam (RESTORIL) 15 MG capsule Take 15 mg by mouth nightly as needed for Sleep. Historical Provider, MD   metoprolol succinate (TOPROL XL) 25 MG extended release tablet Take 25 mg by mouth daily    Historical Provider, MD   atorvastatin (LIPITOR) 10 MG tablet Take 10 mg by mouth daily    Historical Provider, MD   aspirin 81 MG EC tablet Take 81 mg by mouth daily    Historical Provider, MD   Wheat Dextrin (BENEFIBER) POWD Take 4 g by mouth daily (with breakfast)    Historical Provider, MD   famotidine (PEPCID) 20 MG tablet Take 20 mg by mouth 2 times daily    Historical Provider, MD   magnesium oxide (MAG-OX) 400 MG tablet Take 400 mg by mouth daily    Historical Provider, MD   loratadine (CLARITIN) 10 MG tablet Take 10 mg by mouth daily  Patient not taking: Reported on 2022    Historical Provider, MD   Biotin 5000 MCG TABS Take by mouth daily    Historical Provider, MD   traMADol (ULTRAM) 50 MG tablet Take 50 mg by mouth every 6 hours as needed for Pain    Historical Provider, MD   celecoxib (CELEBREX) 100 MG capsule Take 100 mg by mouth    Historical Provider, MD   tiZANidine (ZANAFLEX) 4 MG tablet Take 4 mg by mouth 2 times daily  Patient not taking: Reported on 2022    Historical Provider, MD       ALLERGIES:  Patient has no known allergies.     SOCIAL Hx:  Social History     Socioeconomic History    Marital status:      Spouse name: Not on file    Number of children: Not on file    Years of education: Not on file    Highest education level: Not on file   Occupational History    Not on file   Tobacco Use    Smoking status: Former     Packs/day: 1.00     Years: 9.00     Pack years: 9.00     Types: Cigarettes     Quit date: 1977     Years since quittin.5    Smokeless tobacco: Never   Vaping Use    Vaping Use: Never used   Substance and Sexual Activity    Alcohol use: No     Alcohol/week: 0.0 standard drinks     Comment: socially    Drug use: No    Sexual activity: Yes     Partners: Male   Other Topics Concern    Not on file   Social History Narrative    Not on file     Social Determinants of Health     Financial Resource Strain: Not on file   Food Insecurity: Not on file   Transportation Needs: Not on file   Physical Activity: Not on file   Stress: Not on file   Social Connections: Not on file   Intimate Partner Violence: Not on file   Housing Stability: Not on file       ROS:  General:   Denies chills, fatigue, fever, malaise, night sweats or weight loss    Psychological:   Denies anxiety, disorientation or hallucinations    ENT:    Denies epistaxis, headaches, vertigo or visual changes    Cardiovascular:   Denies any chest pain, irregular heartbeats, or palpitations. No paroxysmal nocturnal dyspnea. Respiratory:   Denies shortness of breath, coughing, sputum production, hemoptysis, or wheezing. No orthopnea. Gastrointestinal:   Denies nausea, vomiting, diarrhea, or constipation. Denies any abdominal pain. Denies change in bowel habits or stools. Genito-Urinary:    Denies any urgency, frequency, hematuria. Voiding without difficulty. Musculoskeletal:   Left hip pain to groin    Neurology:    Denies any headache or focal neurological deficits. No weakness or paresthesia. Derm:    Denies any rashes, ulcers, or excoriations. Denies bruising. Extremities:   Denies any lower extremity swelling or edema. PHYSICAL EXAM:  VITALS:  Vitals:    08/07/22 1730   BP: 138/76   Pulse: 78   Resp: 18   Temp: 98.2 °F (36.8 °C)   SpO2: 95%         CONSTITUTIONAL:    Awake, alert, cooperative, no apparent distress, and appears stated age    EYES:    PERRL, EOMI, sclera clear, conjunctiva normal    ENT:    Normocephalic, atraumatic, sinuses nontender on palpation. External ears without lesions. Oral pharynx with moist mucus membranes. Tonsils without erythema or exudates.     NECK:    Supple, symmetrical, trachea midline, no adenopathy, thyroid symmetric, not enlarged and no tenderness, skin normal, no bruits, no JVD    HEMATOLOGIC/LYMPHATICS:    No cervical lymphadenopathy and no supraclavicular lymphadenopathy    LUNGS:    Symmetric. No increased work of breathing, good air exchange, clear to auscultation bilaterally, no wheezes, rhonchi, or rales,     CARDIOVASCULAR:    Normal apical impulse, regular rate and rhythm, normal S1 and S2, no S3 or S4, and no murmur noted    ABDOMEN:    No scars, normal bowel sounds, soft, non-distended, non-tender, no masses palpated, no hepatosplenomegaly, no rebound or guarding elicited on palpation     MUSCULOSKELETAL:    Unable to due hip flexion for extion due to pain    NEUROLOGIC:    Awake, alert, oriented to name, place and time. Cranial nerves II-XII are grossly intact. Motor is 5 out of 5 bilaterally. SKIN:    No bruising or bleeding. No redness, warmth, or swelling    EXTREMITIES:    Peripheral pulses present. No edema, cyanosis, or swelling.       LABORATORY DATA:  CBC:   Lab Results   Component Value Date/Time    WBC 8.7 08/07/2022 06:17 AM    RBC 3.99 08/07/2022 06:17 AM    HGB 12.1 08/07/2022 06:17 AM    HCT 37.4 08/07/2022 06:17 AM    MCV 93.7 08/07/2022 06:17 AM    MCH 30.3 08/07/2022 06:17 AM    MCHC 32.4 08/07/2022 06:17 AM    RDW 13.1 08/07/2022 06:17 AM     08/07/2022 06:17 AM    MPV 9.1 08/07/2022 06:17 AM     CMP:    Lab Results   Component Value Date/Time     08/07/2022 06:17 AM    K 4.1 08/07/2022 06:17 AM    K 5.0 08/22/2019 08:58 AM     08/07/2022 06:17 AM    CO2 26 08/07/2022 06:17 AM    BUN 15 08/07/2022 06:17 AM    CREATININE 0.8 08/07/2022 06:17 AM    GFRAA >60 08/07/2022 06:17 AM    LABGLOM >60 08/07/2022 06:17 AM    GLUCOSE 101 08/07/2022 06:17 AM    GLUCOSE 91 09/26/2011 09:58 AM    PROT 6.4 08/07/2022 06:17 AM    LABALBU 3.9 08/07/2022 06:17 AM    LABALBU 4.3 09/26/2011 09:58 AM    CALCIUM 9.0 08/07/2022 06:17 AM    BILITOT 0.2 08/07/2022 06:17 AM    ALKPHOS 76 08/07/2022 06:17 AM    AST 14 08/07/2022 06:17 AM    ALT 12 08/07/2022 06:17 AM       ASSESSMENT:  Patient Active Problem List   Diagnosis    Acquired hallux rigidus    Thoracic and lumbosacral neuritis    Secondary localized osteoarthrosis    Sciatica    Neoplasm of uncertain behavior of skin    Late effect of fracture of lower extremity    Hyperlipidemia    Generalized osteoarthritis    Gastroesophageal reflux disease    Foot-drop    Hypertension    Spinal stenosis of lumbar region    Actinic keratosis    Spondylolisthesis, lumbar region    Multiple closed pelvic fractures with disruption of pelvic ring, with delayed healing, subsequent encounter    Pain aggravated by walking    OA (osteoarthritis)    Insomnia       PLAN:  Cont with home med. Change from utram to nor co for moderate pain controlled. Consult ortho to see her.  Maria Isabel Sam MD, M.D.  9:32 PM  8/7/2022     left

## 2022-08-08 NOTE — CARE COORDINATION
CM note: Met with patient for home care provider choice. She states that she does not have a preference. Discussed option of using [facility] [place] d/t affiliation with PCP's partner and she was acceptable of this provider. Referral made to liaison, orders received. Reviewed OT recommendation for [facility], she states she wishes to wait on this as she thinks she may have one at home, if not she will purchase on her own from [facility].

## 2022-08-08 NOTE — DISCHARGE INSTRUCTIONS
Your information:  Name: Munira Her  : 1950    Your instructions:  Discharge home   Follow up with Dr. Whitley Palomino in 1 week    What to do after you leave the hospital:    Recommended diet: regular diet    Recommended activity: activity as tolerated    Signs and symptoms to watch out for:  When should you call for help? Call 911 anytime you think you may need emergency care. For example, call if:    You have chest pain, are short of breath, or you cough up blood. Call your doctor now or seek immediate medical care if:    You have new or worse pain. Your foot is cool or pale or changes color. You have tingling, weakness, or numbness in your foot and toes. You have signs of a blood clot in your leg (called a deep vein thrombosis), such as:  Pain in your calf, back of the knee, thigh, or groin. Redness or swelling in your leg. Watch closely for changes in your health, and be sure to contact your doctor if:    You do not get better as expected. The following personal items were collected during your admission and were returned to you:    Belongings  Dental Appliances: None  Vision - Corrective Lenses: Eyeglasses  Hearing Aid: None  Clothing: Slippers, Pants, Shirt, At home  Jewelry: Ring  Body Piercings Removed: N/A  Electronic Devices: Cell Phone  Weapons (Notify Protective Services/Security): None  Other Valuables: At home  Home Medications: None  Valuables Given To: Family (Comment)  Provide Name(s) of Who Valuable(s) Were Given To:   Responsible person(s) in the waiting room:   Patient approves for provider to speak to responsible person post operatively: Yes    Information obtained by:  By signing below, I understand that if any problems occur once I leave the hospital I am to contact Dr. Whitley Paloimno. I understand and acknowledge receipt of the instructions indicated above.     Broken Pelvis: Care Instructions  Your Care Instructions     The pelvis is the ring of bones between your hips. It connects to the spine and to the leg bones at the hip joints. Blood vessels, nerves, and muscles run through the pelvic ring and can be affected by a break. A broken pelvis alsocan affect the organs in your pelvic area. A broken pelvis may need a few months to heal. You may have had surgery to repair your pelvis, depending on where it was broken and how bad the break was. Your doctor may have put metal screws, pins, or a flaco in your pelvis to fix the break. In some cases, surgery is not needed. While your pelvis heals, you will need to keep weight off the hips. Once you are able to walk, a walker or crutches can help you get around. You can help your pelvis heal with care at home. Your doctor may prescribe medicine to relieve pain and prevent bloodclots. You heal best when you take good care of yourself. Eat a variety of healthyfoods, and don't smoke. Follow-up care is a key part of your treatment and safety. Be sure to make and go to all appointments, and call your doctor if you are having problems. It's also a good idea to know your test results and keep alist of the medicines you take. How can you care for yourself at home? Put ice or a cold pack on the painful area for 10 to 20 minutes at a time. Try to do this every 1 to 2 hours for the next 3 days (when you are awake). Put a thin cloth between the ice and your skin. Be safe with medicines. Take pain medicines exactly as directed. If the doctor gave you a prescription medicine for pain, take it as prescribed. If you are not taking a prescription pain medicine, ask your doctor if you can take an over-the-counter medicine. Put only as much weight on each leg as your doctor tells you to. Your doctor may advise you to use crutches, a walker, or a cane to help you walk. Avoid constipation. Include fruits, vegetables, beans, and whole grains in your diet each day. These foods are high in fiber. Drink plenty of fluids.  If you have kidney, heart, or liver disease and have to limit fluids, talk with your doctor before you increase the amount of fluids you drink. Get some exercise every day, once you are able to walk and your doctor tells you it is okay to exercise. Build up slowly to 30 to 60 minutes a day on 5 or more days of the week. Take a fiber supplement, such as Citrucel or Metamucil, every day if needed. Read and follow all instructions on the label. Schedule time each day for a bowel movement. Having a daily routine may help. Take your time and do not strain when having a bowel movement. When should you call for help? Call 911 anytime you think you may need emergency care. For example, call if:    You have chest pain, are short of breath, or you cough up blood. Call your doctor now or seek immediate medical care if:    You have new or worse pain. Your foot is cool or pale or changes color. You have tingling, weakness, or numbness in your foot and toes. You have signs of a blood clot in your leg (called a deep vein thrombosis), such as:  Pain in your calf, back of the knee, thigh, or groin. Redness or swelling in your leg. Watch closely for changes in your health, and be sure to contact your doctor if:    You do not get better as expected. Where can you learn more? Go to https://[employer]Meta. org and sign in to your [employer] account. Enter V830 in the "[employer] box to learn more about \"Broken Pelvis: Care Instructions. \"     If you do not have an account, please click on the \"Sign Up Now\" link. Current as of: March 9, 2022               Content Version: 13.3  © 7555-3333 [employer], [employer]. Care instructions adapted under license by [facility] ([facility]). If you have questions about a medical condition or this instruction, always ask your healthcare professional. [address] any warranty or liability for your use of this information.

## 2022-08-08 NOTE — PROGRESS NOTES
Physical Therapy    Physical Therapy Initial Evaluation/Plan of Care    Room #:  0340/0340-01  Patient Name: Leigh Eaton  YOB: 1950  MRN: [id number]    Date of Service: 8/8/2022     Tentative placement recommendation: Home Health Physical Therapy   Equipment recommendation: Patient has needed equipment       Evaluating Physical Therapist: Macy Caicedo Student Physical Therapist      Specific Provider Orders/Date/Referring Provider : 08/07/22 0945    PT eval and treat  Start:  08/07/22 0945,   End:  08/07/22 0945,   ONE TIME,   Standing Count:  1 Occurrences,   R         Westley La MD     Admitting Diagnosis:   Multiple closed pelvic fractures with disruption of pelvic ring, with delayed healing, subsequent encounter [S32.810G]    pain has been a chronic issue ever since she fractured her pelvis a year ago  got up to go to the bathroom and was unable to bear weight on her left leg  Patient had an x-ray in March showing that the fractures have still not healed completely despite 7 months of time since fracture  Diverticulosis of the sigmoid colon  Surgery: none      Patient Active Problem List   Diagnosis    Acquired hallux rigidus    Thoracic and lumbosacral neuritis    Secondary localized osteoarthrosis    Sciatica    Neoplasm of uncertain behavior of skin    Late effect of fracture of lower extremity    Hyperlipidemia    Generalized osteoarthritis    Gastroesophageal reflux disease    Foot-drop    Hypertension    Spinal stenosis of lumbar region    Actinic keratosis    Spondylolisthesis, lumbar region    Multiple closed pelvic fractures with disruption of pelvic ring, with delayed healing, subsequent encounter    Pain aggravated by walking    OA (osteoarthritis)    Insomnia        ASSESSMENT of Current Deficits Patient exhibits decreased strength, balance, and endurance impairing functional mobility, transfers, gait , gait distance, and tolerance to activity.  Patient required education and training on walker approximation and gait sequencing today. Patient requires cueing to maintain step-to gait pattern with off loading of left lower extremity. Patient was able to complete two step ups with bilateral upper extremity support and cueing for sequencing. Patient states confusion over how much activity is safe for her delayed healing fracture, home health physical therapy for guidance is recommended. Patient requires skilled physical therapy to address concerns listed above to increase safety and independence at discharge. PHYSICAL THERAPY  PLAN OF CARE       Physical therapy plan of care is established based on physician order,  patient diagnosis and clinical assessment    Current Treatment Recommendations:  -Standing Balance: Perform strengthening exercises in standing to promote motor control with or without upper extremity support   -Transfers: Provide instruction on proper hand and foot position for adequate transfer of weight onto lower extremities and use of gait device if needed and Cues for hand placement, technique and safety. Provide stabilization to prevent fall   -Gait: Gait training and Standing activities to improve: base of support, weight shift, weight bearing    -Endurance: Utilize Supervised activities to increase level of endurance to allow for safe functional mobility including transfers and gait   -Stairs: Stair training with instruction on proper technique and hand placement on rail    PT long term treatment goals are located in below grid    Patient and or family understand(s) diagnosis, prognosis, and plan of care. Frequency of treatments: Patient will be seen  daily.          Prior Level of Function: Patient ambulated independently   Rehab Potential: good  for baseline    Past medical history:   Past Medical History:   Diagnosis Date    Arthritis     Breast cancer ([facility] [address]     infiltrating ductal  right breast    Hallux rigidus of right foot 02/21/2017    Neuroma of foot great toe    PONV (postoperative nausea and vomiting)      Past Surgical History:   Procedure Laterality Date    APPENDECTOMY      BREAST SURGERY Right     lumpectomy    COLONOSCOPY  2016    HYSTERECTOMY (CERVIX STATUS UNKNOWN)      JOINT REPLACEMENT Left     knee, left    LAMINECTOMY  09/01/2015    x2    LUMBAR FUSION      L4-L5 X2    OVARIAN CYST SURGERY      PELVIC FRACTURE SURGERY      PRE-MALIGNANT / BENIGN SKIN LESION EXCISION      squamous cell     TOE FUSION Right 02/21/2017    Right 1st Metatarsophalangeal Joint fusion right great toe neuroma resection    TOE SURGERY Right     great toe    TONSILLECTOMY         SUBJECTIVE:    Precautions:    , falls and LLE weight bearing as tolerated    WBAT - LLE  PT/OT - Short Gait training only initially: 6-10 feet    Social history: Patient lives with spouse in a two story home bedroom and bathroom 2nd floor half bath on first  with 2 steps  to enter without Rail  Walk in [facility] owned: [employer] [address]   How much difficulty turning over in bed?: None  How much difficulty sitting down on / standing up from a chair with arms?: A Little  How much difficulty moving from lying on back to sitting on side of bed?: A Little  How much help from another person moving to and from a bed to a chair?: A Little  How much help from another person needed to walk in hospital room?: A Little  How much help from another person for climbing 3-5 steps with a railing?: A Lot  AM-PAC Inpatient Mobility Raw Score : 18  AM-PAC Inpatient T-Scale Score : 43.63  Mobility Inpatient CMS 0-100% Score: 46.58  Mobility Inpatient CMS G-Code Modifier : CK    Nursing cleared patient for PT evaluation. The admitting diagnosis and active problem list as listed above have been reviewed prior to the initiation of this evaluation.     OBJECTIVE;   Initial Evaluation  Date: 08/08/2022 Treatment Date:     Short Term/ Long Term   Goals Was pt agreeable to Eval/treatment? Yes  To be met in 2 days   Pain level   7/10 in left groin and hip region      Bed Mobility    Rolling: Not assessed patient seated edge of bed    Supine to sit: Not assessed patient seated edge of bed    Sit to supine: Supervision     Scooting: Supervision     Rolling: Independent    Supine to sit: Independent    Sit to supine: Independent    Scooting: Independent     Transfers Sit to stand: Moderate assist of 1 during first rep with wheeled walker. Patient educated on hand placement, weight shifting onto lower extremities, weight bearing as tolerated status on left lower extremity    Min assist with wheeled walker during second and third rep   Sit to stand: Modified Independent with wheeled walker    Ambulation     10x2, 5x1 feet using  wheeled walker with Minimal assist of 1   for walker control, walker approximation, and weight shift  Patient requires cueing to perform step-to gait pattern with off loading of left lower extremity     50 feet using  wheeled walker with Supervision     Stair negotiation: ascended and descended   2 steps bilateral upper extremity support, supervision for balance, cues for sequencing     10 steps upper extremity support and supervision    ROM Within functional limits       Strength BUE:  refer to OT eval  RLE:  3-/5. Foot drop noted at right ankle from prior lumbar surgery   LLE:  3-/5.  Patient must use upper extremity assist to aid left extremity onto bed during sit to supine transfer   Increase strength in affected mm groups by 1/3 grade   Balance Sitting EOB:  good   Dynamic Standing:  fair wheeled walker   Sitting EOB:  good   Dynamic Standing: fair + wheeled walker      Patient is Alert & Oriented x person, place, time, and situation and follows directions    Sensation:  Patient  reports numbness/tingling right lower extremity     Edema:  no   Endurance: fair  -    Vitals: room air   Blood Pressure at rest  Blood Pressure during session Heart Rate at rest 78 Heart Rate during session    SPO2 at rest 98%  SPO2 during session %     Patient education  Patient educated on role of Physical Therapy, risks of immobility, safety and plan of care,  importance of mobility while in hospital , importance and purpose of adaptive device and adjusted to proper height for the patient. , stair training , and weight bearing status      Patient response to education:   Pt verbalized understanding Pt demonstrated skill Pt requires further education in this area   Yes Partial Yes      Treatment:  Patient practiced and was instructed/facilitated in the following treatment: Patient  Sat edge of bed 5 minutes with Independent to increase dynamic sitting balance and activity tolerance. Patient performed sit to stand transfer x3, ambulated 10 feet within roomx2, performed two step ups, ambulated to restroom and performed independent toileting, ambulated back to bed and performed sit to supine transfer. Education given on weight bearing status and gait pattern. Therapeutic Exercises:  not performed      At end of session, patient in bed with     call light and phone within reach,  all lines and tubes intact, nursing notified. Patient would benefit from continued skilled Physical Therapy to improve functional independence and quality of life. Patient's/ family goals   get stronger    Time in  0834  Time out  0905    Total Treatment Time  11 minutes    Evaluation time includes thorough review of current medical information, gathering information on past medical history/social history and prior level of function, completion of standardized testing/informal observation of tasks, assessment of data, and development of Plan of care and goals.      CPT codes:  Low Complexity PT evaluation (47601)  Gait Training (20236) 11 minutes 1 unit(s)    Yasmin Burrell, Student Physical Therapist

## 2022-08-09 NOTE — DISCHARGE SUMMARY
Physician Discharge Summary     Patient ID:  Oly Granado  [id number]  89 y.o.  1950    Admit date: 8/7/2022    Discharge date and time: 8/8/2022 11:16 AM     Admitting Physician: Coreen Adrian MD     Discharge Physician; Jacquelyn Bergman MD    Admission Diagnoses: Multiple closed pelvic fractures with disruption of pelvic ring, with delayed healing, subsequent encounter [S32.810G]    Discharge Diagnoses: Closed pelvic fracture with disruption of pelvic ring with delayed healing, CT scan showed old fracture, osteoporosis. Admission Condition: Acute pelvic pain    Discharged Condition: Pain control ambulating with wheeled walker    Indication for Admission: Intractable pain and left pelvic area    Hospital Course: Patient admitted through emergency room and had been under a lot of stress had been physically active up and ambulating at home started having intractable pain left pelvic area came to emergency room, was admitted for observation and pain management patient with closed pelvic fractures CT scan showed old fracture, patient continued physical therapy and ambulated with walker pain was controlled on Norco 7.5. Patient has appointment to see orthopedic. Patient has not started Prolia injections yet. Consults: Orthopedic    Significant Diagnostic Studies:   CT PELVIS WO CONTRAST Additional Contrast? None    Result Date: 8/7/2022  EXAMINATION: CT OF THE PELVIS WITHOUT CONTRAST 8/7/2022 3:36 am TECHNIQUE: CT of the pelvis was performed without the administration of intravenous contrast.  Multiplanar reformatted images are provided for review. Adjustment of mA and/or kV according to patient size was utilized. Automated exposure control, iterative reconstruction, and/or weight based adjustment of the mA/kV was utilized to reduce the radiation dose to as low as reasonably achievable. COMPARISON: None.  HISTORY: ORDERING SYSTEM PROVIDED HISTORY: left pelvic pain with history of nonhealing fractures TECHNOLOGIST PROVIDED HISTORY: Reason for exam:->left pelvic pain with history of nonhealing fractures Additional Contrast?->None Decision Support Exception - unselect if not a suspected or confirmed emergency medical condition->Emergency Medical Condition (MA) FINDINGS: There is diverticulosis of the sigmoid colon. The remaining intrapelvic organs are unremarkable. There are old left pubic rami fractures     Diverticulosis of the sigmoid colon. There are old ununited superior and inferior  left pubic rami fractures. Treatments: Pain management and physical therapy. Discharge Exam:  Patient awake afebrile. Neck no JVD no lymphadenopathy. Vital signs stable, lungs clear to auscultation, normal heart sounds, abdomen soft, extremity no edema, patient ambulating with help of walker with minimal pain. Disposition: Home. Patient Instructions:      Medication List        START taking these medications      HYDROcodone-acetaminophen 7.5-325 MG per tablet  Commonly known as: NORCO  Take 1 tablet by mouth every 6 hours as needed for Pain for up to 14 days.      loratadine 10 MG tablet  Commonly known as: CLARITIN     Prolia 60 MG/ML Sosy SC injection  Generic drug: denosumab     tiZANidine 4 MG tablet  Commonly known as: Cleave Fusi taking these medications      atorvastatin 10 MG tablet  Commonly known as: LIPITOR     Benefiber Powd     Biotin 5000 MCG Tabs     Calcium Carb-Cholecalciferol 600-500 MG-UNIT Caps     celecoxib 100 MG capsule  Commonly known as: CELEBREX     diclofenac sodium 1 % Gel  Commonly known as: VOLTAREN     famotidine 20 MG tablet  Commonly known as: PEPCID     magnesium oxide 400 MG tablet  Commonly known as: MAG-OX     metoprolol succinate 25 MG extended release tablet  Commonly known as: TOPROL XL     temazepam 15 MG capsule  Commonly known as: RESTORIL            STOP taking these medications      aspirin 81 MG EC tablet     traMADol 50 MG tablet  Commonly known

## 2022-08-26 DIAGNOSIS — Z87.81 HISTORY OF PELVIC FRACTURE: Primary | ICD-10-CM

## 2022-08-29 SDOH — HEALTH STABILITY: PHYSICAL HEALTH: ON AVERAGE, HOW MANY MINUTES DO YOU ENGAGE IN EXERCISE AT THIS LEVEL?: 50 MIN

## 2022-08-29 SDOH — HEALTH STABILITY: PHYSICAL HEALTH: ON AVERAGE, HOW MANY DAYS PER WEEK DO YOU ENGAGE IN MODERATE TO STRENUOUS EXERCISE (LIKE A BRISK WALK)?: 3 DAYS

## 2022-08-30 ENCOUNTER — OFFICE VISIT (OUTPATIENT)
Dept: ORTHOPEDIC SURGERY | Age: 72
End: 2022-08-30
Payer: MEDICARE

## 2022-08-30 ENCOUNTER — HOSPITAL ENCOUNTER (OUTPATIENT)
Dept: GENERAL RADIOLOGY | Age: 72
Discharge: HOME | End: 2022-09-01
Payer: MEDICARE

## 2022-08-30 VITALS — BODY MASS INDEX: 24.75 KG/M2 | WEIGHT: 145 LBS | HEIGHT: 64 IN

## 2022-08-30 DIAGNOSIS — S32.810K MULTIPLE CLOSED FRACTURES OF PELVIS WITH STABLE DISRUPTION OF PELVIC RING WITH NONUNION, SUBSEQUENT ENCOUNTER: Primary | ICD-10-CM

## 2022-08-30 DIAGNOSIS — Z87.81 HISTORY OF PELVIC FRACTURE: ICD-10-CM

## 2022-08-30 DIAGNOSIS — M81.6 LOCALIZED OSTEOPOROSIS (LEQUESNE): ICD-10-CM

## 2022-08-30 PROCEDURE — G8420 CALC BMI NORM PARAMETERS: HCPCS | Performed by: ORTHOPAEDIC SURGERY

## 2022-08-30 PROCEDURE — 1111F DSCHRG MED/CURRENT MED MERGE: CPT | Performed by: ORTHOPAEDIC SURGERY

## 2022-08-30 PROCEDURE — 99204 OFFICE O/P NEW MOD 45 MIN: CPT | Performed by: ORTHOPAEDIC SURGERY

## 2022-08-30 PROCEDURE — 1123F ACP DISCUSS/DSCN MKR DOCD: CPT | Performed by: ORTHOPAEDIC SURGERY

## 2022-08-30 PROCEDURE — 3017F COLORECTAL CA SCREEN DOC REV: CPT | Performed by: ORTHOPAEDIC SURGERY

## 2022-08-30 PROCEDURE — G8427 DOCREV CUR MEDS BY ELIG CLIN: HCPCS | Performed by: ORTHOPAEDIC SURGERY

## 2022-08-30 PROCEDURE — 1090F PRES/ABSN URINE INCON ASSESS: CPT | Performed by: ORTHOPAEDIC SURGERY

## 2022-08-30 PROCEDURE — G8399 PT W/DXA RESULTS DOCUMENT: HCPCS | Performed by: ORTHOPAEDIC SURGERY

## 2022-08-30 PROCEDURE — 1036F TOBACCO NON-USER: CPT | Performed by: ORTHOPAEDIC SURGERY

## 2022-08-30 PROCEDURE — 99202 OFFICE O/P NEW SF 15 MIN: CPT | Performed by: ORTHOPAEDIC SURGERY

## 2022-08-30 PROCEDURE — 72170 X-RAY EXAM OF PELVIS: CPT

## 2022-08-30 RX ORDER — TERIPARATIDE 250 UG/ML
20 INJECTION, SOLUTION SUBCUTANEOUS DAILY
Qty: 7.44 ML | Refills: 1 | Status: SHIPPED | OUTPATIENT
Start: 2022-08-30

## 2022-08-30 RX ORDER — TRAMADOL HYDROCHLORIDE 50 MG/1
50 TABLET ORAL NIGHTLY PRN
COMMUNITY

## 2022-08-30 RX ORDER — CELECOXIB 200 MG/1
CAPSULE ORAL
COMMUNITY
Start: 2022-08-01

## 2022-08-30 NOTE — PROGRESS NOTES
Take 10 mg by mouth daily      Biotin 5000 MCG TABS Take by mouth daily      tiZANidine (ZANAFLEX) 4 MG tablet Take 4 mg by mouth 2 times daily      HYDROcodone-acetaminophen (NORCO) 7.5-325 MG per tablet Take 1 tablet by mouth every 6 hours as needed for Pain for up to 14 days. 60 tablet 0     No current facility-administered medications for this visit. Facility-Administered Medications Ordered in Other Visits   Medication Dose Route Frequency Provider Last Rate Last Admin    gadobutrol (GADAVIST) injection 6 mL  6 mL IntraVENous ONCE PRN Leidy Ga, DO         Allergies: Patient has no known allergies.   Past Medical History:   Diagnosis Date    Arthritis     Breast cancer ([facility] [address]     infiltrating ductal  right breast    Hallux rigidus of right foot 2017    Neuroma of foot     great toe    PONV (postoperative nausea and vomiting)      Past Surgical History:   Procedure Laterality Date    APPENDECTOMY      BREAST SURGERY Right     lumpectomy    COLONOSCOPY  2016    HYSTERECTOMY (CERVIX STATUS UNKNOWN)      JOINT REPLACEMENT Left     knee, left    LAMINECTOMY  09/01/2015    x2    LUMBAR FUSION      L4-L5 X2    OVARIAN CYST SURGERY      PELVIC FRACTURE SURGERY      PRE-MALIGNANT / BENIGN SKIN LESION EXCISION      squamous cell     TOE FUSION Right 2017    Right 1st Metatarsophalangeal Joint fusion right great toe neuroma resection    TOE SURGERY Right     great toe    TONSILLECTOMY       Family History   Problem Relation Age of Onset    Breast Cancer Mother 58    Breast Cancer Sister 58    Cancer Paternal Grandmother 80        pancreatic     Social History     Tobacco Use    Smoking status: Former     Packs/day: 1.00     Years: 9.00     Pack years: 9.00     Types: Cigarettes     Quit date: 1977     Years since quittin.5    Smokeless tobacco: Never   Substance Use Topics    Alcohol use: No     Alcohol/week: 0.0 standard drinks     Comment: socially                             Chief Complaint Patient presents with    New Patient     Left pelvic fx nonunion. Patient states injury occurred Memorial day 2021. Patient having left sided groin pain and difficulty ambulating. Patient states that PT was tried initially but this aggravated her symptoms. Patient states MRI showed the fractures before x-ray did. Patient states that she was doing much better for awhile and then in July of 2022 she had a setback and the pain returned. Dr. Imelda Carter then referred to Dr. Edel Rosenbaum. SUBJECTIVE: cc left pelvis pain. 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 Michael Reese office for chronic pain in her pelvis and recurrent nonunion. Her story started Memorial Day weekend of 2021 where she fell and injured her pelvis but did not know she had a fracture. X-rays were initially negative and she was treated with physical therapy which did not help. She was then found on a delayed basis to have a left inferior and superior pubic rami fracture. He was kept on a walker for several weeks and then switch to a cane and then nothing. It took her a very long time to get the fractures to be asymptomatic she was followed up on a routine basis and then in January 2022 the pain went away. She then started exercising again 3 days a week plus yoga 1 day a week and was doing quite well for almost 6 months until about 6 weeks ago when she redeveloped pelvic pain. 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 She was sent here for possible surgical versus conservative treatment. Review of Systems   Constitutional: Negative for fever, chills, diaphoresis, appetite change and fatigue. HENT: Negative for dental issues, hearing loss and tinnitus. Negative for congestion, sinus pressure, sneezing, sore throat. Negative for headache. Eyes: Negative for visual disturbance, blurred and double vision.  Negative inferior pubic ramus ischium. She has otherwise has 5 out of 5 motor strength in her lower extremities she has good capillary refill. 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 I do not see any fracture of the sacrum. She has a fusion of the spine to the sacrum with screws into the sacrum. 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 Patient otherwise reasonably healthy. History of breast cancer 30 years ago. Diagnosis Orders   1. Multiple closed fractures of pelvis with stable disruption of pelvic ring with nonunion, subsequent encounter  CBC    Comprehensive Metabolic Panel    Vitamin D 25 Hydroxy    TSH    PTH, Intact    teriparatide (FORTEO) 620 MCG/2.48ML SOPN injection      2. Localized osteoporosis ([place])   CBC    Comprehensive Metabolic Panel    Vitamin D 25 Hydroxy    TSH    PTH, Intact    teriparatide (FORTEO) 620 MCG/2.48ML SOPN injection          Discussion: Had lengthy discussion with patient regarding Her diagnosis, typical prognosis, and expected outcomes. I reviewed the possible complications from the injury itself despite treatment choosen. I also discussed treatment options including nonoperative managements versus surgical management, along with risks and benefits of each. They have elected for nonoperative management at this time. PLAN:  I had a long discussion with the patient about operative versus conservative treatment. I think it is reasonable to offer her Forteo treatment as a trial for 3 to 6 months. Forteo has been shown to increase the rate of nonunion healing of pelvis ring injuries in this population.   We have also ordered a nonunion composition work-up including vitamin D calcium levels thyroid parathyroid and a comprehensive metabolic panel. We will start her on Forteo which will need to be approved by her insurance company. We will follow her approximately every 6 weeks for clinical and x-ray evaluations. If she does not heal these fractures after a 6-month period, I think a surgical option of percutaneous screw fixation may be a possibility. For this we would have to refer her to [facility]. I have spoken to , who is the expertise that this particular procedure. He agrees that conservative management at this time is appropriate with Forteo. Electronically signed by Anthony Serna MD on 8/30/2022 at 3:53 PM  Note: This report was completed using Veveo voiced recognition software. Every effort has been made to ensure accuracy; however, inadvertent computerized transcription errors may be present.

## 2022-08-30 NOTE — PATIENT INSTRUCTIONS
Blood work ordered  Started on [employer] wait for bloodwork results prior to endocrinology referral

## 2022-09-20 ENCOUNTER — OFFICE VISIT (OUTPATIENT)
Dept: ENDOCRINOLOGY | Age: 72
End: 2022-09-20
Payer: MEDICARE

## 2022-09-20 VITALS
HEART RATE: 73 BPM | DIASTOLIC BLOOD PRESSURE: 82 MMHG | OXYGEN SATURATION: 97 % | HEIGHT: 64 IN | SYSTOLIC BLOOD PRESSURE: 119 MMHG | WEIGHT: 149 LBS | BODY MASS INDEX: 25.44 KG/M2

## 2022-09-20 DIAGNOSIS — M81.0 OSTEOPOROSIS, UNSPECIFIED OSTEOPOROSIS TYPE, UNSPECIFIED PATHOLOGICAL FRACTURE PRESENCE: Primary | ICD-10-CM

## 2022-09-20 DIAGNOSIS — E55.9 VITAMIN D DEFICIENCY: ICD-10-CM

## 2022-09-20 PROCEDURE — G8417 CALC BMI ABV UP PARAM F/U: HCPCS | Performed by: INTERNAL MEDICINE

## 2022-09-20 PROCEDURE — 1036F TOBACCO NON-USER: CPT | Performed by: INTERNAL MEDICINE

## 2022-09-20 PROCEDURE — 1123F ACP DISCUSS/DSCN MKR DOCD: CPT | Performed by: INTERNAL MEDICINE

## 2022-09-20 PROCEDURE — G8399 PT W/DXA RESULTS DOCUMENT: HCPCS | Performed by: INTERNAL MEDICINE

## 2022-09-20 PROCEDURE — 99204 OFFICE O/P NEW MOD 45 MIN: CPT | Performed by: INTERNAL MEDICINE

## 2022-09-20 PROCEDURE — 3017F COLORECTAL CA SCREEN DOC REV: CPT | Performed by: INTERNAL MEDICINE

## 2022-09-20 PROCEDURE — 1090F PRES/ABSN URINE INCON ASSESS: CPT | Performed by: INTERNAL MEDICINE

## 2022-09-20 PROCEDURE — G8427 DOCREV CUR MEDS BY ELIG CLIN: HCPCS | Performed by: INTERNAL MEDICINE

## 2022-09-20 NOTE — PROGRESS NOTES
[address] [place] [address] Department of Endocrinology Diabetes and Metabolism   [address] [facility], [address] [facility],[address] [id number]   Phone: [phone]  Fax: [phone]    Date of Service: 9/20/2022    Primary Care Physician: Marina Lara MD.  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 No  was used. Accuracy of the patient data is excellent  Ailyn Valencia is a very pleasant 67 y.o. female seen today for management of osteoporosis and nonunion of Lt hip   The patient was in her usual state of health until THE [facility] Day weekend of 2021 when she fell and injured her pelvis but did not know she had a fracture. X-rays were initially negative and she was treated with physical therapy which did not help. She was then found on a delayed basis to have a left inferior and superior pubic rami fracture. He was kept on a walker for several weeks and then switch to a cane and then nothing. It took her a very long time to get the fractures to be asymptomatic she was followed up on a routine basis and then in January 2022 the pain went away. She then about 6 weeks ago when she redeveloped pelvic pain. She was found by x-ray and CT scan to have a nonunion of left inferior and superior pubic ramus fractures nonunions.        DEXA scan 5/2022  Hip T score - 1.2, Fem Neck T score - 1.8, distal radium T score -2.6     She is currently on Ca and VitD supplement        PAST MEDICAL HISTORY   Past Medical History:   Diagnosis Date    Arthritis     Breast cancer ([address]     infiltrating ductal  right breast    Hallux rigidus of right foot 02/21/2017    Neuroma of foot     great toe    PONV (postoperative nausea and vomiting)        PAST SURGICAL HISTORY   Past Surgical History:   Procedure Laterality Date TABS Take by mouth daily      tiZANidine (ZANAFLEX) 4 MG tablet Take 4 mg by mouth 2 times daily      teriparatide (FORTEO) 620 MCG/2.48ML SOPN injection Inject 0.08 mLs into the skin daily (Patient not taking: Reported on 9/20/2022) 7.44 mL 1    HYDROcodone-acetaminophen (NORCO) 7.5-325 MG per tablet Take 1 tablet by mouth every 6 hours as needed for Pain for up to 14 days. 60 tablet 0     No current facility-administered medications for this visit. Facility-Administered Medications Ordered in Other Visits   Medication Dose Route Frequency Provider Last Rate Last Admin    gadobutrol (GADAVIST) injection 6 mL  6 mL IntraVENous ONCE PRN Bernita Snellen,            Review of Systems    Constitutional: No fever, no chills, no diaphoresis, no generalized weakness. HEENT: No blurred vision, No sore throat, no ear pain, no hair loss  Neck: denied any neck swelling, difficulty swallowing,   Cadrdiopulomary: No CP, SOB or palpitation, No orthopnea or PND. No cough or wheezing. GI: No N/V/D, no constipation, No abdominal pain, no melena or hematochezia   : Denied any dysuria, hematuria, flank pain, discharge, or incontinence. Skin: denied any rash, ulcer, Hirsute, or hyperpigmentation. MSK: denied any joint deformity, joint pain/swelling, muscle pain, or back pain. Neuro: no numbess, no tingling, no weakness, __  Psychiatric/Behavioral: Negative for sleep disturbance and dysphoric mood. The patient is not nervous/anxious.      Objective:   /82   Pulse 73   Ht 5' 4\" (1.626 m)   Wt 149 lb (67.6 kg)   SpO2 97%   BMI 25.58 kg/m²   BP Readings from Last 4 Encounters:   09/20/22 119/82   08/08/22 117/78   06/23/22 104/73   12/06/21 118/60     Wt Readings from Last 6 Encounters:   09/20/22 149 lb (67.6 kg)   08/30/22 145 lb (65.8 kg)   08/07/22 146 lb (66.2 kg)   06/23/22 150 lb (68 kg)   05/31/22 148 lb (67.1 kg)   12/06/21 150 lb (68 kg)       Physical examination:  General: awake alert, no abnormal position or movements. HEENT: normocephalic non traumatic. Neck: supple, no LN enlargement, no thyromegaly, no thyroid tenderness, no JVD. Pulm: clear equal air entry no added sounds,  CVS: S1 + S2, no murmur, no heave. Abd: soft lax no tenderness, no organomegaly, audible bowel sounds. MSK: no back deformity, no local spine tesnderness  Skin: warm, no lesions, no rash.  No striae no Bruises   Neuro: CN intact, sensation notmal , muscle power normal  Psych: normal mood, and affect     Lab review   I personally reviewed the following labs:   Lab Results   Component Value Date/Time    WBC 8.7 08/07/2022 06:17 AM    RBC 3.99 08/07/2022 06:17 AM    HGB 12.1 08/07/2022 06:17 AM    HCT 37.4 08/07/2022 06:17 AM    MCV 93.7 08/07/2022 06:17 AM    MCH 30.3 08/07/2022 06:17 AM    MCHC 32.4 08/07/2022 06:17 AM    RDW 13.1 08/07/2022 06:17 AM     08/07/2022 06:17 AM    MPV 9.1 08/07/2022 06:17 AM      Lab Results   Component Value Date/Time     08/07/2022 06:17 AM    K 4.1 08/07/2022 06:17 AM    K 5.0 08/22/2019 08:58 AM    CO2 26 08/07/2022 06:17 AM    BUN 15 08/07/2022 06:17 AM    CALCIUM 9.0 08/07/2022 06:17 AM      Lab Results   Component Value Date/Time    LABA1C 5.9 09/24/2021 10:31 AM    GLUCOSE 101 08/07/2022 06:17 AM    GLUCOSE 91 09/26/2011 09:58 AM     Lab Results   Component Value Date/Time    TSH 2.280 09/23/2021 08:56 AM    T4FREE 1.06 10/05/2020 11:07 AM    FT3 3.4 06/23/2014 09:40 AM    FT3 3.3 10/21/2013 08:32 AM     No results found for: PTH  No results found for: MG  No results found for: PHOS  Lab Results   Component Value Date/Time    VITD25 60 10/12/2021 10:47 AM    VITD25 48 10/05/2020 11:07 AM    VITD25 35 08/22/2019 08:58 AM    VITD25 44 07/13/2015 09:55 AM     No results found for: CALCITONIN  No results found for: PTHRP  No results found for: Particia Mireille  No results found for: URINEVOLUME    Impression and plan:  [id number] [address] Baltazar who is 67 y.o. 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 Gravitational forces on the skeleton will help limit calcium liberation from the bones. I personally reviewed external notes from PCP and other patient's care team providers, and personally interpreted labs associated with the above diagnosis. I also ordered labs to further assess and manage the above addressed medical conditions. No follow-ups on file. The above issues were reviewed with the patient who understood and agreed with the plan. Thank you for allowing us to participate in the care of this patient. Please do not hesitate to contact us with any additional questions. Carlos Ramirez MD  Endocrinologist, [facility]   [address] [address]   Phone: [phone]  Fax: [phone]  ----------------------------  An electronic signature was used to authenticate this note.  Alberto Salinas MD on 9/20/2022 at 9:12 AM

## 2022-09-21 ENCOUNTER — TELEPHONE (OUTPATIENT)
Dept: ENDOCRINOLOGY | Age: 72
End: 2022-09-21

## 2022-09-22 RX ORDER — TERIPARATIDE 250 UG/ML
20 INJECTION, SOLUTION SUBCUTANEOUS DAILY
Qty: 2.24 ML | Refills: 5 | Status: SHIPPED | OUTPATIENT
Start: 2022-09-22

## 2022-09-25 ENCOUNTER — TELEPHONE (OUTPATIENT)
Dept: ENDOCRINOLOGY | Age: 72
End: 2022-09-25

## 2022-09-25 NOTE — TELEPHONE ENCOUNTER
Khari Ramirez,  Any updated on getting Forteo for this pt    Also, please make sure we get PTH and other blood work before starting E. AUBRIE Diehl

## 2022-09-26 ENCOUNTER — TELEPHONE (OUTPATIENT)
Dept: ENDOCRINOLOGY | Age: 72
End: 2022-09-26

## 2022-09-26 NOTE — TELEPHONE ENCOUNTER
I got a tier exception for the pt's Forteo approved, and it's still $1400/month. I wasn't sure if you wanted to try Evenity to see it it would be cheaper. Please advise.

## 2022-12-02 ASSESSMENT — ENCOUNTER SYMPTOMS
DIARRHEA: 0
NAUSEA: 0
COUGH: 0
SHORTNESS OF BREATH: 0
VOMITING: 0
BLOOD IN STOOL: 0
BACK PAIN: 1
CONSTIPATION: 0

## 2022-12-02 NOTE — PROGRESS NOTES
Summary: This note was copied forward from the last encounter. Essential components for this patient record were reviewed and verified on this visit including:  recent hospitalizations, recent imaging, PMH, PSH, FH, SOC HX, Allergies, and Medications were reviewed and updated as appropriate. In addition, the assessment and plan were copied from prior office note and updated accordingly. Patient ID: Yg Painter is a 67 y.o. female. HPI    Patient's Name/Date of Birth: Yg Painter / 1950    PCP: Jaden Arredondo MD.    67 y.o. extremely pleasant female with PMH pre-menopausal poorly differentiated invasive ductal carcinoma of the right breast, located in the 9 o'clock position ER/AZ positive tumor diagnosed in 1993. Associated extensive intraductal carcinoma. Tumor size 1.7 cm with positive superficial surgical margin. IDC, (T1) right breast per her records s/p lumpectomy with LND per Dr. David Price @ [facility] (reported as negative lymph nodes). Post chemotherapy per Dr. Damian Richardson followed by adjuvant Radiation therapy. Endocrine therapy with Tamoxifen, however she only took it for 1 year due to poor tolerance; Took Evista until September 2021. Discontinued Evista after pelvic fracture from a fall. Pathology report brought by Ailyn:                Imaging to date @ [facility]: Type III breast density. 09/24/2018 bilateral screening mammogram: Negative, BI-RADS 1.  09/25/2019 bilateral screening mammogram: Focal asymmetry in the left breast.  BI-RADS 0.  09/55/2019 left breast ultrasound: Negative, BI-RADS 1.  03/05/2020 MRI bilateral breasts:  Benign findings (BI-RADS-2). 12/01/2020 B/L screening mammogram:  Negative, BI-RADS 1.     -On Raloxifene for bone health per PCP. No clinically suspicious findings.   04/22/2021 MRI of the bilateral breast: Negative, BI-RADS 1.  12/06/2021 bilateral screening mammogram negative, BI-RADS 1.  05/31/2022 MRI of the bilateral breast: Post breast conservative therapy changes on the right. No evidence of malignancy. BI-RADS 2.  2022 bilateral screening mammogram: Negative, BI-RADS 1. History of Leiomyoma  post TAHBSO    Age of menarche was 15. Age of menopause was 1994 due to chemotherapy. Patient denies hormonal therapy. Patient is . Age of first live birth was 32;  Patient did breast feed. Patient drinks little caffeinated beverages. She does not smoke cigarettes. Quit . Occupation: Retired Nurse. Review of Systems   Constitutional:  Negative for activity change, appetite change, chills, fatigue, fever and unexpected weight change. Continues to do well overall. Underwent pelvic fracture repair with hardware placement approximately 4 weeks ago at [facility]. Continues to progress nicely in her recovery. Ambulating with a walker. No breast related complaints on this visit. No pertinent additional family history to report. Respiratory:  Negative for cough and shortness of breath. Cardiovascular:  Negative for chest pain and palpitations. Gastrointestinal:  Negative for blood in stool, constipation, diarrhea, nausea and vomiting. Diverticulosis on colonoscopy; constipation controlled with AM fiber. She has significant, chronic issues with reflux. Her last EGD was in . She reports symptoms remain well controlled with Pepcid 20 mg twice daily. Musculoskeletal:  Positive for arthralgias and back pain. Negative for gait problem, joint swelling, myalgias, neck pain and neck stiffness. Chronic back pain, post fusions. Right foot drop due to back pain. Overall her pain is stable. Recovering from recent hip surgery. Psychiatric/Behavioral:  The patient is not nervous/anxious. Objective:   Physical Exam  Vitals and nursing note reviewed. Constitutional:       General: She is not in acute distress. Appearance: Normal appearance. She is well-developed.  She is not diaphoretic. Comments: ECOG 1 due to recent surgery. HENT:      Head: Normocephalic and atraumatic. Mouth/Throat:      Pharynx: No oropharyngeal exudate. Eyes:      General: No scleral icterus. Right eye: No discharge. Left eye: No discharge. Conjunctiva/sclera: Conjunctivae normal.   Neck:      Thyroid: No thyromegaly. Vascular: No JVD. Trachea: No tracheal deviation. Cardiovascular:      Rate and Rhythm: Normal rate and regular rhythm. Heart sounds: No murmur heard. No friction rub. No gallop. Pulmonary:      Effort: Pulmonary effort is normal. No respiratory distress or retractions. Breath sounds: Normal breath sounds. No stridor. No wheezing or rales. Chest:      Chest wall: No mass, lacerations, deformity, swelling, tenderness or edema. Breasts:     Breasts are asymmetrical (Chronic asymmetry in the posttreatment setting.). Right: No inverted nipple, mass, nipple discharge, skin change or tenderness. Left: Inverted nipple (Chronic and clinically and subjectively stable.) present. No mass, nipple discharge, skin change or tenderness. Comments: Chronic and stable skin thickening on the right c/w post treatment changes. Scars intact. No evidence of recurrent disease on the right; no evidence of malignancy on left. Abdominal:      General: There is no distension. Palpations: Abdomen is soft. Tenderness: There is no abdominal tenderness. There is no guarding or rebound. Musculoskeletal:         General: No tenderness or deformity. Normal range of motion. Right shoulder: Normal.      Left shoulder: Normal.      Cervical back: Normal range of motion and neck supple. Comments: Gait steady with walker assist.   Lymphadenopathy:      Cervical: No cervical adenopathy. Right cervical: No superficial, deep or posterior cervical adenopathy.      Left cervical: No superficial, deep or posterior cervical adenopathy. Upper Body:      Right upper body: No pectoral adenopathy. Left upper body: No pectoral adenopathy. Skin:     General: Skin is warm and dry. Coloration: Skin is not pale. Findings: No erythema or rash. Neurological:      Mental Status: She is alert and oriented to person, place, and time. Coordination: Coordination normal.   Psychiatric:         Behavior: Behavior normal.         Thought Content: Thought content normal.         Judgment: Judgment normal.      Assessment:      67 y.o. female with history of Premenopausal (age 37) IDC, (T1) right breast infiltrating ductal carcinoma in the 9 o'clock position per her records in 1993, ER/ME positive s/p lumpectomy with LND per Dr. David Price @ [facility] (reported as negative lymph nodes). There was extensive intraductal component, poorly differentiated tumor size was 1.5. Superficial margins were reported as not clear. Chemotherapy and Radiation therapy followed by endocrine therapy with Tamoxifen, however she only took it for 1 year due to poor tolerance; was on Raloxifene but has since discontinued it. Pathology report from [facility] requested, but no longer available. Breast cancer risk factors include age, gender, personal history of breast cancer; Mother with breast cancer in her 63's with re-occurrence in her 66's;  Sister with breast cancer in her 63's; Paternal grandmother with Pancreatic cancer. She presented 09/24/2018 to establish care. 09/24/2018 bilateral screening mammogram: Negative.    -On 12/05/2018 [employer] Genetic test: BRCA-1 VUS. Sister and niece also have BRCA-1 VUS      Seen by Plastics and Reconstructive surgery, Dr. Beverly Pinto on 10/11/2018 for asymmetry; she opted to forgo surgery at this time.     09/25/2019 bilateral screening mammogram: Focal asymmetry in the left breast.  BI-RADS 0.  09/25/2019 Left breast US: Additional evaluation for the focal asymmetry and the left breast seen on mammogram dated 9/25/2019. No suspicious solid or cystic mass. Negative, BI-RADS  1    Imaging to date @ [facility]: Type III breast density. 09/24/2018 bilateral screening mammogram: Negative, BI-RADS 1.  09/25/2019 bilateral screening mammogram: Focal asymmetry in the left breast.  BI-RADS 0.  09/55/2019 left breast ultrasound: Negative, BI-RADS 1.  03/05/2020 MRI bilateral breasts:  Benign findings (BI-RADS-2). 12/01/2020 B/L screening mammogram:  Negative, BI-RADS 1.     -On Raloxifene for bone health per PCP. No clinically suspicious findings. 04/22/2021 MRI of the bilateral breast: Negative, BI-RADS 1.  12/06/2021 bilateral screening mammogram negative, BI-RADS 1.  05/31/2022 MRI of the bilateral breast: Post breast conservative therapy changes on the right. No evidence of malignancy. BI-RADS 2.  12/07/2022 bilateral screening mammogram: Negative, BI-RADS 1.      12/07/2022 bilateral screening mammogram: Negative, BI-RADS 1. We reviewed her breast imaging today for educational (not interpretive) purposes on this visit. We discussed breast anatomy, breast density as assigned by radiology, the bi-rads result, and reviewed the purpose of any biopsy or surgical clips (did not verify placement) noted on imaging. In addition, we discussed any changes on imaging as they relate to post procedure/post treatment. It was clearly stated to Mario Andreas [address] and her  that interpretation of imaging and the final result of imaging is at the discretion of the reading radiologist.  Clinical follow-up is without evidence of malignancy. She has a personal history of breast cancer in 1993; also a strong family history of breast cancer and a BRCA VUS. She has continued on high risk screening per NCCN recommended guidelines. Had a long discussion today regarding need for continuing annual MRIs of the bilateral breast.  We reviewed that current guidelines recommend high risk screening until age 76.   We discussed that an alternative screening modality would be complete bilateral breast ultrasounds although the evidence is limited. She is hesitant to repeat the MRI of the bilateral breast therefore, I offered her to return to clinic in 6 months with bilateral complete breast ultrasounds prior and a follow-up visit with Dr. Nathalia Akbar, which she is agreeable. Plan:      Continue monthly breast self examination; detailed instructions reviewed today. Bring any changes to your physician's attention. Continue healthy diet and exercise routinely as tolerated. Avoid alcohol or limit alcohol intake to < 3 drinks per week. Limit caffeine intake. Repeat mammogram December 2023  Continue follow up with Primary Care, orthopedics, and all specialties as directed. Return to see in 6 months with bilateral complete breast ultrasounds and office visit with Dr. Nathalia Akbar. Return to see me in 1 year with bilateral screening mammogram same day. I spent a total of 28 minutes on the date of the service which included preparing to see the patient, face-to-face patient care, completing clinical documentation, obtaining and/or reviewing separately obtained history, performing a medically appropriate examination, counseling and educating the patient/family/caregiver, ordering medications, tests, or procedures, communicating with other HCPs (not separately reported), independently interpreting results (not separately reported), communicating results to the patient/family/caregiver and care coordination (not separately reported). This document is generated, in part, by voice recognition software and thus syntax and grammatical errors are possible. Neela Gracia, RN, MSN, APRN-CNP, [id number]  Advanced Oncology Certified Nurse Practitioner  Department of Breast Surgery  [facility] Breast [facility]/  [facility] in collaboration with Dr. Alfredo Knapp.  Swathi/Dr. Stacey Leslie/Dr. Deanne Stacy APRN-CNP

## 2022-12-05 DIAGNOSIS — Z12.31 ENCOUNTER FOR SCREENING MAMMOGRAM FOR BREAST CANCER: Primary | ICD-10-CM

## 2022-12-07 ENCOUNTER — HOSPITAL ENCOUNTER (OUTPATIENT)
Dept: GENERAL RADIOLOGY | Age: 72
Discharge: HOME | End: 2022-12-09
Payer: MEDICARE

## 2022-12-07 ENCOUNTER — OFFICE VISIT (OUTPATIENT)
Dept: BREAST CENTER | Age: 72
End: 2022-12-07
Payer: MEDICARE

## 2022-12-07 VITALS
HEIGHT: 64 IN | DIASTOLIC BLOOD PRESSURE: 84 MMHG | TEMPERATURE: 97.9 F | SYSTOLIC BLOOD PRESSURE: 132 MMHG | HEART RATE: 70 BPM | BODY MASS INDEX: 25.61 KG/M2 | OXYGEN SATURATION: 97 % | WEIGHT: 150 LBS | RESPIRATION RATE: 12 BRPM

## 2022-12-07 DIAGNOSIS — R92.2 DENSE BREAST TISSUE: ICD-10-CM

## 2022-12-07 DIAGNOSIS — Z91.89 AT HIGH RISK FOR BREAST CANCER: Primary | ICD-10-CM

## 2022-12-07 DIAGNOSIS — Z85.3 HISTORY OF BREAST CANCER: ICD-10-CM

## 2022-12-07 DIAGNOSIS — Z12.31 ENCOUNTER FOR SCREENING MAMMOGRAM FOR BREAST CANCER: ICD-10-CM

## 2022-12-07 DIAGNOSIS — Z85.3 PERSONAL HISTORY OF MALIGNANT NEOPLASM OF BREAST: ICD-10-CM

## 2022-12-07 DIAGNOSIS — Z80.3 FAMILY HISTORY OF BREAST CANCER IN FIRST DEGREE RELATIVE: ICD-10-CM

## 2022-12-07 DIAGNOSIS — R92.2 DENSE BREAST TISSUE ON MAMMOGRAM: ICD-10-CM

## 2022-12-07 DIAGNOSIS — Z12.39 BREAST CANCER SCREENING, HIGH RISK PATIENT: ICD-10-CM

## 2022-12-07 DIAGNOSIS — Z85.3 PERSONAL HISTORY OF BREAST CANCER: ICD-10-CM

## 2022-12-07 DIAGNOSIS — Z01.818 PREOP TESTING: ICD-10-CM

## 2022-12-07 PROCEDURE — 99213 OFFICE O/P EST LOW 20 MIN: CPT | Performed by: NURSE PRACTITIONER

## 2022-12-07 PROCEDURE — 77063 BREAST TOMOSYNTHESIS BI: CPT

## 2022-12-07 RX ORDER — ASPIRIN 81 MG/1
81 TABLET ORAL DAILY
COMMUNITY

## 2023-03-20 ENCOUNTER — OFFICE VISIT (OUTPATIENT)
Dept: ENDOCRINOLOGY | Age: 73
End: 2023-03-20
Payer: MEDICARE

## 2023-03-20 VITALS
DIASTOLIC BLOOD PRESSURE: 80 MMHG | SYSTOLIC BLOOD PRESSURE: 116 MMHG | HEIGHT: 64 IN | OXYGEN SATURATION: 99 % | WEIGHT: 152 LBS | RESPIRATION RATE: 18 BRPM | HEART RATE: 70 BPM | BODY MASS INDEX: 25.95 KG/M2

## 2023-03-20 DIAGNOSIS — M81.0 OSTEOPOROSIS, UNSPECIFIED OSTEOPOROSIS TYPE, UNSPECIFIED PATHOLOGICAL FRACTURE PRESENCE: Primary | ICD-10-CM

## 2023-03-20 DIAGNOSIS — E55.9 VITAMIN D DEFICIENCY: ICD-10-CM

## 2023-03-20 PROCEDURE — G8399 PT W/DXA RESULTS DOCUMENT: HCPCS | Performed by: INTERNAL MEDICINE

## 2023-03-20 PROCEDURE — 1123F ACP DISCUSS/DSCN MKR DOCD: CPT | Performed by: INTERNAL MEDICINE

## 2023-03-20 PROCEDURE — G8428 CUR MEDS NOT DOCUMENT: HCPCS | Performed by: INTERNAL MEDICINE

## 2023-03-20 PROCEDURE — G8484 FLU IMMUNIZE NO ADMIN: HCPCS | Performed by: INTERNAL MEDICINE

## 2023-03-20 PROCEDURE — 3017F COLORECTAL CA SCREEN DOC REV: CPT | Performed by: INTERNAL MEDICINE

## 2023-03-20 PROCEDURE — 3078F DIAST BP <80 MM HG: CPT | Performed by: INTERNAL MEDICINE

## 2023-03-20 PROCEDURE — 3074F SYST BP LT 130 MM HG: CPT | Performed by: INTERNAL MEDICINE

## 2023-03-20 PROCEDURE — 1036F TOBACCO NON-USER: CPT | Performed by: INTERNAL MEDICINE

## 2023-03-20 PROCEDURE — 1090F PRES/ABSN URINE INCON ASSESS: CPT | Performed by: INTERNAL MEDICINE

## 2023-03-20 PROCEDURE — 99214 OFFICE O/P EST MOD 30 MIN: CPT | Performed by: INTERNAL MEDICINE

## 2023-03-20 PROCEDURE — G8417 CALC BMI ABV UP PARAM F/U: HCPCS | Performed by: INTERNAL MEDICINE

## 2023-03-20 NOTE — PROGRESS NOTES
Neuroma of foot     great toe    PONV (postoperative nausea and vomiting)        PAST SURGICAL HISTORY   Past Surgical History:   Procedure Laterality Date    APPENDECTOMY      BREAST SURGERY Right     lumpectomy    COLONOSCOPY  2016    HYSTERECTOMY (CERVIX STATUS UNKNOWN)      JOINT REPLACEMENT Left     knee, left    LAMINECTOMY  09/01/2015    x2    LUMBAR FUSION      L4-L5 X2    OVARIAN CYST SURGERY      PELVIC FRACTURE SURGERY  10/2022    PRE-MALIGNANT / BENIGN SKIN LESION EXCISION      squamous cell     TOE FUSION Right 02/21/2017    Right 1st Metatarsophalangeal Joint fusion right great toe neuroma resection    TOE SURGERY Right     great toe    TONSILLECTOMY         SOCIAL HISTORY   Tobacco:   reports that she quit smoking about 46 years ago. Her smoking use included cigarettes. She has a 9.00 pack-year smoking history. She has never used smokeless tobacco.  Alcohol:   reports no history of alcohol use. Drugs:   reports no history of drug use. FAMILY HISTORY   Family History   Problem Relation Age of Onset    Breast Cancer Mother 58    Breast Cancer Sister 58    Cancer Paternal Grandmother 80        pancreatic       ALLERGIES AND DRUG REACTIONS   No Known Allergies    CURRENT MEDICATIONS     Current Outpatient Medications   Medication Sig Dispense Refill    aspirin 81 MG EC tablet Take 81 mg by mouth daily      celecoxib (CELEBREX) 200 MG capsule       traMADol (ULTRAM) 50 MG tablet Take 50 mg by mouth nightly as needed for Pain.       diclofenac sodium (VOLTAREN) 1 % GEL Voltaren 1 % topical gel      Calcium Carb-Cholecalciferol 600-500 MG-UNIT CAPS Take by mouth 2 times daily      temazepam (RESTORIL) 15 MG capsule Take 15 mg by mouth nightly as needed for Sleep.      metoprolol succinate (TOPROL XL) 25 MG extended release tablet Take 25 mg by mouth daily      atorvastatin (LIPITOR) 10 MG tablet Take 10 mg by mouth daily      Wheat Dextrin (BENEFIBER) POWD Take 4 g by mouth daily (with breakfast)

## 2023-03-21 LAB
ALBUMIN SERPL-MCNC: 4.1 G/DL
ALP BLD-CCNC: 68 U/L
ALT SERPL-CCNC: 17 U/L
ANION GAP SERPL CALCULATED.3IONS-SCNC: NORMAL MMOL/L
AST SERPL-CCNC: 18 U/L
BILIRUB SERPL-MCNC: 0.6 MG/DL (ref 0.1–1.4)
BUN BLDV-MCNC: 17 MG/DL
CALCIUM SERPL-MCNC: 9.2 MG/DL
CALCIUM SERPL-MCNC: 9.2 MG/DL
CHLORIDE BLD-SCNC: 106 MMOL/L
CO2: 27 MMOL/L
CREAT SERPL-MCNC: 0.83 MG/DL
EGFR: 74
GLUCOSE BLD-MCNC: 92 MG/DL
POTASSIUM SERPL-SCNC: 4.5 MMOL/L
PTH INTACT: 39
SODIUM BLD-SCNC: 139 MMOL/L
TOTAL PROTEIN: 6.5
VITAMIN D 25-HYDROXY: 41
VITAMIN D2, 25 HYDROXY: NORMAL
VITAMIN D3,25 HYDROXY: NORMAL

## 2023-03-23 ENCOUNTER — TELEPHONE (OUTPATIENT)
Dept: ENDOCRINOLOGY | Age: 73
End: 2023-03-23

## 2023-03-23 DIAGNOSIS — M81.0 OSTEOPOROSIS, UNSPECIFIED OSTEOPOROSIS TYPE, UNSPECIFIED PATHOLOGICAL FRACTURE PRESENCE: ICD-10-CM

## 2023-03-23 DIAGNOSIS — E55.9 VITAMIN D DEFICIENCY: ICD-10-CM

## 2023-03-25 ENCOUNTER — TELEPHONE (OUTPATIENT)
Dept: ENDOCRINOLOGY | Age: 73
End: 2023-03-25

## 2023-03-25 RX ORDER — TERIPARATIDE 250 UG/ML
20 INJECTION, SOLUTION SUBCUTANEOUS DAILY
Qty: 2.24 ML | Refills: 4 | Status: SHIPPED | OUTPATIENT
Start: 2023-03-25

## 2023-09-12 PROBLEM — M81.0 OSTEOPOROSIS: Status: ACTIVE | Noted: 2023-09-12

## 2023-09-12 PROBLEM — M54.16 LUMBAR RADICULOPATHY: Status: ACTIVE | Noted: 2023-09-12

## 2023-09-12 PROBLEM — K21.9 GERD (GASTROESOPHAGEAL REFLUX DISEASE): Status: ACTIVE | Noted: 2023-09-12

## 2023-09-12 PROBLEM — I89.0 LYMPHEDEMA: Status: ACTIVE | Noted: 2023-09-12

## 2023-09-12 PROBLEM — M85.80 OSTEOPENIA: Status: ACTIVE | Noted: 2023-09-12

## 2023-09-12 PROBLEM — M19.90 ARTHRITIS: Status: ACTIVE | Noted: 2023-09-12

## 2023-09-12 PROBLEM — M51.26 HERNIATED LUMBAR INTERVERTEBRAL DISC: Status: ACTIVE | Noted: 2023-09-12

## 2023-09-12 PROBLEM — S32.9XXA PELVIC FRACTURE (MULTI): Status: ACTIVE | Noted: 2023-09-12

## 2023-09-12 PROBLEM — S32.82XK: Status: ACTIVE | Noted: 2023-09-12

## 2023-09-12 RX ORDER — ATORVASTATIN CALCIUM 10 MG/1
1 TABLET, FILM COATED ORAL DAILY
COMMUNITY

## 2023-09-12 RX ORDER — LORATADINE 10 MG/1
1 TABLET ORAL DAILY
COMMUNITY

## 2023-09-12 RX ORDER — DOCUSATE SODIUM 100 MG/1
100 CAPSULE, LIQUID FILLED ORAL 2 TIMES DAILY
COMMUNITY
Start: 2022-11-05

## 2023-09-12 RX ORDER — TRAMADOL HYDROCHLORIDE 50 MG/1
50 TABLET ORAL 4 TIMES DAILY PRN
COMMUNITY
Start: 2016-05-10

## 2023-09-12 RX ORDER — VITAMIN E (DL,TOCOPHERYL ACET) 45 MG/0.25
1 DROPS ORAL 2 TIMES DAILY
COMMUNITY

## 2023-09-12 RX ORDER — METOPROLOL SUCCINATE 25 MG/1
1 TABLET, EXTENDED RELEASE ORAL DAILY
COMMUNITY

## 2023-09-12 RX ORDER — ASPIRIN 81 MG/1
81 TABLET ORAL 2 TIMES DAILY
COMMUNITY
Start: 2022-11-05

## 2023-09-12 RX ORDER — TEMAZEPAM 15 MG/1
15 CAPSULE ORAL NIGHTLY PRN
COMMUNITY

## 2023-09-12 RX ORDER — ELECTROLYTES/DEXTROSE
5 SOLUTION, ORAL ORAL DAILY
COMMUNITY

## 2023-09-12 RX ORDER — MULTIVITAMIN
1 TABLET ORAL 2 TIMES DAILY
COMMUNITY
Start: 2022-11-05

## 2023-09-12 RX ORDER — CALCIUM CARBONATE 200(500)MG
TABLET,CHEWABLE ORAL
COMMUNITY
Start: 2017-06-22

## 2023-09-12 RX ORDER — VITAMIN E MIXED 400 UNIT
CAPSULE ORAL
COMMUNITY

## 2023-09-12 RX ORDER — FAMOTIDINE 20 MG/1
20 TABLET, FILM COATED ORAL 2 TIMES DAILY
COMMUNITY

## 2023-09-12 RX ORDER — OXYCODONE AND ACETAMINOPHEN 5; 325 MG/1; MG/1
1 TABLET ORAL
COMMUNITY
Start: 2022-11-05

## 2023-09-12 RX ORDER — CELECOXIB 200 MG/1
CAPSULE ORAL
COMMUNITY
Start: 2012-11-12

## 2023-09-12 RX ORDER — LANOLIN ALCOHOL/MO/W.PET/CERES
1 CREAM (GRAM) TOPICAL DAILY
COMMUNITY

## 2023-09-12 RX ORDER — LYSINE HCL 500 MG
1 TABLET ORAL DAILY
COMMUNITY
Start: 2014-01-07

## 2023-09-12 RX ORDER — RALOXIFENE HYDROCHLORIDE 60 MG/1
TABLET, FILM COATED ORAL
COMMUNITY
Start: 2012-11-26

## 2023-09-12 RX ORDER — TIZANIDINE 4 MG/1
4 TABLET ORAL 2 TIMES DAILY
COMMUNITY
Start: 2016-05-10

## 2023-10-16 ENCOUNTER — ANCILLARY PROCEDURE (OUTPATIENT)
Dept: RADIOLOGY | Facility: CLINIC | Age: 73
End: 2023-10-16
Payer: MEDICARE

## 2023-10-16 ENCOUNTER — OFFICE VISIT (OUTPATIENT)
Dept: ORTHOPEDIC SURGERY | Facility: CLINIC | Age: 73
End: 2023-10-16
Payer: MEDICARE

## 2023-10-16 VITALS — WEIGHT: 145 LBS | BODY MASS INDEX: 24.75 KG/M2 | HEIGHT: 64 IN

## 2023-10-16 DIAGNOSIS — Z87.310 H/O HEALED FRAGILITY FRACTURE: Primary | ICD-10-CM

## 2023-10-16 DIAGNOSIS — S32.810D CLOSED PELVIC RING FRACTURE WITH ROUTINE HEALING, SUBSEQUENT ENCOUNTER: ICD-10-CM

## 2023-10-16 PROCEDURE — 99214 OFFICE O/P EST MOD 30 MIN: CPT | Performed by: ORTHOPAEDIC SURGERY

## 2023-10-16 PROCEDURE — 1125F AMNT PAIN NOTED PAIN PRSNT: CPT | Performed by: ORTHOPAEDIC SURGERY

## 2023-10-16 PROCEDURE — 1159F MED LIST DOCD IN RCRD: CPT | Performed by: ORTHOPAEDIC SURGERY

## 2023-10-16 PROCEDURE — 1036F TOBACCO NON-USER: CPT | Performed by: ORTHOPAEDIC SURGERY

## 2023-10-16 PROCEDURE — 72190 X-RAY EXAM OF PELVIS: CPT | Mod: FY

## 2023-10-16 PROCEDURE — 72190 X-RAY EXAM OF PELVIS: CPT | Performed by: RADIOLOGY

## 2023-10-16 ASSESSMENT — PAIN - FUNCTIONAL ASSESSMENT: PAIN_FUNCTIONAL_ASSESSMENT: NO/DENIES PAIN

## 2023-10-16 NOTE — PROGRESS NOTES
Subjective    Patient ID: Omaira López is a 73 y.o. female.    Chief Complaint: Follow-up of the Pelvis     Last Surgery: Percutaneous fixation of anterior posterior pelvic ring on November 4, 2022  HPI  Hide 80 show injury was Rosalba of the 2021.  She was noted to have a left lateral compression pelvic ring injury that was treated with closed treatment.  Unfortunate she developed nonunion and was referred to me.  She underwent operative fixation.  Patient reported that she is doing very well and very happy that she proceeded with surgery.  She is ambulate independently.  She has no pain.  Her back pain has resolved.  No pain in the groin.  She is very pleased.  Objective   Ortho Exam  Gen: The patient is alert and oriented ×3, is in no acute distress, and appear their stated age and weight.    Patient is able to ambulate independently.  No pain on rotation range of motion of the hip.  No tenderness to palpation in the pelvis.  No pain with internal rotation stress exam of the pelvis.  Normal sensation and strength distally.  The foot is well-perfused.  Image Results:  I have personally reviewed pelvic radiograph that reveals stable fixation of the pelvis.  Interval callus formation fracture line no longer visible.  Assessment/Plan   Encounter Diagnoses:  Closed pelvic ring fracture with routine healing, subsequent encounter  Patient is doing very well.  Her fracture is healed clinically and radiographically.  At this point she will question activities as tolerated.  We discussed fall prevention at home.  I recommended bone health evaluation and management by her primary care physician is order to minimize risk of another fragility fracture.  Patient to continue strengthening exercises at home.  Activities as tolerated.  At this point she will come back and see me only as in the future.  Orders Placed This Encounter    XR pelvis 3+ views     No follow-ups on file.

## 2023-12-04 ASSESSMENT — ENCOUNTER SYMPTOMS
NAUSEA: 0
VOMITING: 0
BACK PAIN: 1
CONSTIPATION: 0
SHORTNESS OF BREATH: 0
COUGH: 0
BLOOD IN STOOL: 0
DIARRHEA: 0

## 2023-12-04 NOTE — PROGRESS NOTES
Summary: This note was copied forward from the last encounter. Essential components for this patient record were reviewed and verified on this visit including:  recent hospitalizations, recent imaging, PMH, PSH, FH, SOC HX, Allergies, and Medications were reviewed and updated as appropriate. In addition, the assessment and plan were copied from prior office note and updated accordingly. Patient ID: Carlos Dowd is a 68 y.o. female. HPI    PCP: Nicol Ahumada MD.    68 y.o. extremely pleasant female with PMH pre-menopausal poorly differentiated invasive ductal carcinoma of the right breast, located in the 9 o'clock position ER/OR positive tumor diagnosed in . Associated extensive intraductal carcinoma. Tumor size 1.7 cm with positive superficial surgical margin. IDC, (T1) right breast per her records s/p lumpectomy with LND per Dr. Sho Feliz @ [facility] (reported as negative lymph nodes). Post chemotherapy per Dr. Ghazala Herr followed by adjuvant Radiation therapy. Endocrine therapy with Tamoxifen, however she only took it for 1 year due to poor tolerance; Took Evista until 2021. Discontinued Evista after pelvic fracture from a fall. Age of menarche was 15. Age of menopause was 1994 due to chemotherapy. Patient denies hormonal therapy. Patient is . Age of first live birth was 32;  Patient did breast feed. Patient drinks little caffeinated beverages. She does not smoke cigarettes. Quit . Pathology report brought by Ailyn:                Imaging to date @ [facility]: Type III breast density. 2018 bilateral screening mammogram: Negative, BI-RADS 1.  2019 bilateral screening mammogram: Focal asymmetry in the left breast.  BI-RADS 0.   left breast ultrasound: Negative, BI-RADS 1.  2020 MRI bilateral breasts:  Benign findings (BI-RADS-2). 2020 B/L screening mammogram:  Negative, BI-RADS 1.     -On Raloxifene for bone health per PCP.

## 2023-12-11 DIAGNOSIS — Z12.31 ENCOUNTER FOR SCREENING MAMMOGRAM FOR BREAST CANCER: Primary | ICD-10-CM

## 2023-12-12 ENCOUNTER — HOSPITAL ENCOUNTER (OUTPATIENT)
Dept: GENERAL RADIOLOGY | Age: 73
Discharge: HOME | End: 2023-12-14
Payer: MEDICARE

## 2023-12-12 ENCOUNTER — OFFICE VISIT (OUTPATIENT)
Dept: BREAST CENTER | Age: 73
End: 2023-12-12
Payer: MEDICARE

## 2023-12-12 VITALS — HEIGHT: 64 IN | BODY MASS INDEX: 25.95 KG/M2 | WEIGHT: 152 LBS

## 2023-12-12 VITALS
SYSTOLIC BLOOD PRESSURE: 116 MMHG | BODY MASS INDEX: 24.59 KG/M2 | RESPIRATION RATE: 18 BRPM | HEIGHT: 64 IN | HEART RATE: 73 BPM | TEMPERATURE: 97.8 F | DIASTOLIC BLOOD PRESSURE: 62 MMHG | OXYGEN SATURATION: 97 % | WEIGHT: 144 LBS

## 2023-12-12 DIAGNOSIS — Z12.31 ENCOUNTER FOR SCREENING MAMMOGRAM FOR BREAST CANCER: ICD-10-CM

## 2023-12-12 DIAGNOSIS — Z85.3 HISTORY OF BREAST CANCER: Primary | ICD-10-CM

## 2023-12-12 DIAGNOSIS — R92.30 DENSE BREAST: ICD-10-CM

## 2023-12-12 DIAGNOSIS — R92.2 DENSE BREAST: ICD-10-CM

## 2023-12-12 PROCEDURE — 1090F PRES/ABSN URINE INCON ASSESS: CPT | Performed by: NURSE PRACTITIONER

## 2023-12-12 PROCEDURE — G8484 FLU IMMUNIZE NO ADMIN: HCPCS | Performed by: NURSE PRACTITIONER

## 2023-12-12 PROCEDURE — 99213 OFFICE O/P EST LOW 20 MIN: CPT | Performed by: NURSE PRACTITIONER

## 2023-12-12 PROCEDURE — G8417 CALC BMI ABV UP PARAM F/U: HCPCS | Performed by: NURSE PRACTITIONER

## 2023-12-12 PROCEDURE — G8427 DOCREV CUR MEDS BY ELIG CLIN: HCPCS | Performed by: NURSE PRACTITIONER

## 2023-12-12 PROCEDURE — 3074F SYST BP LT 130 MM HG: CPT | Performed by: NURSE PRACTITIONER

## 2023-12-12 PROCEDURE — 3017F COLORECTAL CA SCREEN DOC REV: CPT | Performed by: NURSE PRACTITIONER

## 2023-12-12 PROCEDURE — 77063 BREAST TOMOSYNTHESIS BI: CPT

## 2023-12-12 PROCEDURE — 1123F ACP DISCUSS/DSCN MKR DOCD: CPT | Performed by: NURSE PRACTITIONER

## 2023-12-12 PROCEDURE — G8399 PT W/DXA RESULTS DOCUMENT: HCPCS | Performed by: NURSE PRACTITIONER

## 2023-12-12 PROCEDURE — 1036F TOBACCO NON-USER: CPT | Performed by: NURSE PRACTITIONER

## 2023-12-12 PROCEDURE — 3078F DIAST BP <80 MM HG: CPT | Performed by: NURSE PRACTITIONER

## 2024-06-05 ENCOUNTER — HOSPITAL ENCOUNTER (OUTPATIENT)
Dept: MAMMOGRAPHY | Age: 74
Discharge: HOME | End: 2024-06-07
Attending: INTERNAL MEDICINE
Payer: MEDICARE

## 2024-06-05 DIAGNOSIS — M81.0 AGE-RELATED OSTEOPOROSIS WITHOUT CURRENT PATHOLOGICAL FRACTURE: ICD-10-CM

## 2024-06-05 PROCEDURE — 77080 DXA BONE DENSITY AXIAL: CPT

## 2024-06-17 ENCOUNTER — HOSPITAL ENCOUNTER (OUTPATIENT)
Dept: GENERAL RADIOLOGY | Age: 74
Discharge: HOME | End: 2024-06-19
Payer: MEDICARE

## 2024-06-17 VITALS — WEIGHT: 145 LBS | HEIGHT: 65 IN | BODY MASS INDEX: 24.16 KG/M2

## 2024-06-17 DIAGNOSIS — R92.30 DENSE BREAST: ICD-10-CM

## 2024-06-17 DIAGNOSIS — Z85.3 HISTORY OF BREAST CANCER: ICD-10-CM

## 2024-06-17 PROCEDURE — 76641 ULTRASOUND BREAST COMPLETE: CPT

## 2024-10-09 DIAGNOSIS — Z12.31 ENCOUNTER FOR SCREENING MAMMOGRAM FOR BREAST CANCER: Primary | ICD-10-CM

## 2024-11-27 NOTE — PROGRESS NOTES
Summary:          This note was copied forward from the last encounter.  Essential components for this patient record were reviewed and verified on this visit including:  recent hospitalizations, recent imaging, PMH, PSH, FH, SOC HX, Allergies, and Medications were reviewed and updated as appropriate.  In addition, the assessment and plan were copied from prior office note and updated accordingly.     Patient ID: Ailyn Valencia is a 74 y.o. female.    HPI    PCP: Sunil Molina MD.    Ailyn is a 74 y.o. extremely pleasant female with PMH pre-menopausal poorly differentiated invasive ductal carcinoma of the right breast, located in the 9 o'clock position ER/MT positive tumor diagnosed in .  Associated extensive intraductal carcinoma.  Tumor size 1.7 cm with positive superficial surgical margin.  IDC, (T1) right breast per her records s/p lumpectomy with LND per Dr. Martha Stout @ [facility] (reported as negative lymph nodes).  Post chemotherapy per Dr. Bowen Sousa followed by adjuvant Radiation therapy.  Endocrine therapy with Tamoxifen, however she only took it for 1 year due to poor tolerance; Took Evista until 2021.  Discontinued Evista after pelvic fracture from a fall. Age of menarche was 12. Age of menopause was 1994 due to chemotherapy.  Patient denies hormonal therapy. Patient is . Age of first live birth was 26;  Patient did breast feed.Patient drinks little caffeinated beverages. She does not smoke cigarettes.  Quit .      Pathology report brought by Ailyn:                Imaging to date @ [facility]: Type III breast density.  2018 bilateral screening mammogram: Negative, BI-RADS 1.  2019 bilateral screening mammogram: Focal asymmetry in the left breast.  BI-RADS 0.   left breast ultrasound: Negative, BI-RADS 1.  2020 MRI bilateral breasts:  Benign findings (BI-RADS-2).   2020 B/L screening mammogram:  Negative, BI-RADS 1.     -On Raloxifene for bone

## 2024-12-17 ENCOUNTER — OFFICE VISIT (OUTPATIENT)
Dept: BREAST CENTER | Age: 74
End: 2024-12-17
Payer: MEDICARE

## 2024-12-17 ENCOUNTER — HOSPITAL ENCOUNTER (OUTPATIENT)
Dept: GENERAL RADIOLOGY | Age: 74
Discharge: HOME | End: 2024-12-19
Payer: MEDICARE

## 2024-12-17 VITALS
DIASTOLIC BLOOD PRESSURE: 72 MMHG | TEMPERATURE: 97.4 F | RESPIRATION RATE: 12 BRPM | OXYGEN SATURATION: 96 % | SYSTOLIC BLOOD PRESSURE: 120 MMHG | BODY MASS INDEX: 25.78 KG/M2 | WEIGHT: 151 LBS | HEART RATE: 77 BPM | HEIGHT: 64 IN

## 2024-12-17 VITALS — WEIGHT: 145 LBS | HEIGHT: 65 IN | BODY MASS INDEX: 24.16 KG/M2

## 2024-12-17 DIAGNOSIS — Z85.3 HISTORY OF BREAST CANCER: ICD-10-CM

## 2024-12-17 DIAGNOSIS — Z12.31 ENCOUNTER FOR SCREENING MAMMOGRAM FOR BREAST CANCER: ICD-10-CM

## 2024-12-17 DIAGNOSIS — Z85.3 PERSONAL HISTORY OF BREAST CANCER: Primary | ICD-10-CM

## 2024-12-17 DIAGNOSIS — R92.30 DENSE BREAST: ICD-10-CM

## 2024-12-17 PROCEDURE — G8484 FLU IMMUNIZE NO ADMIN: HCPCS | Performed by: NURSE PRACTITIONER

## 2024-12-17 PROCEDURE — 99213 OFFICE O/P EST LOW 20 MIN: CPT | Performed by: NURSE PRACTITIONER

## 2024-12-17 PROCEDURE — 1036F TOBACCO NON-USER: CPT | Performed by: NURSE PRACTITIONER

## 2024-12-17 PROCEDURE — 1123F ACP DISCUSS/DSCN MKR DOCD: CPT | Performed by: NURSE PRACTITIONER

## 2024-12-17 PROCEDURE — 1160F RVW MEDS BY RX/DR IN RCRD: CPT | Performed by: NURSE PRACTITIONER

## 2024-12-17 PROCEDURE — 3074F SYST BP LT 130 MM HG: CPT | Performed by: NURSE PRACTITIONER

## 2024-12-17 PROCEDURE — 77063 BREAST TOMOSYNTHESIS BI: CPT

## 2024-12-17 PROCEDURE — 3017F COLORECTAL CA SCREEN DOC REV: CPT | Performed by: NURSE PRACTITIONER

## 2024-12-17 PROCEDURE — 1090F PRES/ABSN URINE INCON ASSESS: CPT | Performed by: NURSE PRACTITIONER

## 2024-12-17 PROCEDURE — G8427 DOCREV CUR MEDS BY ELIG CLIN: HCPCS | Performed by: NURSE PRACTITIONER

## 2024-12-17 PROCEDURE — G8419 CALC BMI OUT NRM PARAM NOF/U: HCPCS | Performed by: NURSE PRACTITIONER

## 2024-12-17 PROCEDURE — G8399 PT W/DXA RESULTS DOCUMENT: HCPCS | Performed by: NURSE PRACTITIONER

## 2024-12-17 PROCEDURE — 1159F MED LIST DOCD IN RCRD: CPT | Performed by: NURSE PRACTITIONER

## 2024-12-17 PROCEDURE — 3078F DIAST BP <80 MM HG: CPT | Performed by: NURSE PRACTITIONER

## 2025-07-15 ENCOUNTER — HOSPITAL ENCOUNTER (OUTPATIENT)
Dept: GENERAL RADIOLOGY | Age: 75
Discharge: HOME | End: 2025-07-17
Payer: MEDICARE

## 2025-07-15 DIAGNOSIS — Z85.3 HISTORY OF BREAST CANCER: ICD-10-CM

## 2025-07-15 DIAGNOSIS — R92.30 DENSE BREAST: ICD-10-CM

## 2025-07-15 PROCEDURE — 76641 ULTRASOUND BREAST COMPLETE: CPT
